# Patient Record
Sex: FEMALE | Race: WHITE | HISPANIC OR LATINO | Employment: OTHER | URBAN - METROPOLITAN AREA
[De-identification: names, ages, dates, MRNs, and addresses within clinical notes are randomized per-mention and may not be internally consistent; named-entity substitution may affect disease eponyms.]

---

## 2020-10-22 ENCOUNTER — APPOINTMENT (OUTPATIENT)
Dept: CT IMAGING | Facility: CLINIC | Age: 80
End: 2020-10-22
Attending: EMERGENCY MEDICINE
Payer: COMMERCIAL

## 2020-10-22 ENCOUNTER — HOSPITAL ENCOUNTER (EMERGENCY)
Facility: CLINIC | Age: 80
Discharge: OTHER INSTITUTION | End: 2020-10-22
Attending: EMERGENCY MEDICINE | Admitting: EMERGENCY MEDICINE
Payer: COMMERCIAL

## 2020-10-22 ENCOUNTER — HOSPITAL ENCOUNTER (OUTPATIENT)
Facility: CLINIC | Age: 80
Setting detail: OBSERVATION
Discharge: HOME | End: 2020-10-23
Attending: INTERNAL MEDICINE | Admitting: INTERNAL MEDICINE
Payer: COMMERCIAL

## 2020-10-22 VITALS
DIASTOLIC BLOOD PRESSURE: 84 MMHG | SYSTOLIC BLOOD PRESSURE: 163 MMHG | WEIGHT: 175 LBS | TEMPERATURE: 98.3 F | BODY MASS INDEX: 33.04 KG/M2 | OXYGEN SATURATION: 99 % | RESPIRATION RATE: 17 BRPM | HEART RATE: 53 BPM | HEIGHT: 61 IN

## 2020-10-22 DIAGNOSIS — R68.89 EPISODES OF DECREASED ATTENTIVENESS: ICD-10-CM

## 2020-10-22 DIAGNOSIS — G45.9 TIA (TRANSIENT ISCHEMIC ATTACK): Primary | ICD-10-CM

## 2020-10-22 DIAGNOSIS — R47.9 SPEECH DISTURBANCE, UNSPECIFIED TYPE: ICD-10-CM

## 2020-10-22 DIAGNOSIS — I95.9 TRANSIENT HYPOTENSION: ICD-10-CM

## 2020-10-22 PROBLEM — R47.81 SLURRED SPEECH: Status: ACTIVE | Noted: 2020-10-22

## 2020-10-22 LAB
ALBUMIN SERPL-MCNC: 3.1 G/DL (ref 3.4–5)
ALBUMIN UR-MCNC: NEGATIVE MG/DL
ALP SERPL-CCNC: 75 U/L (ref 40–150)
ALT SERPL W P-5'-P-CCNC: 16 U/L (ref 0–50)
ANION GAP SERPL CALCULATED.3IONS-SCNC: 4 MMOL/L (ref 3–14)
APPEARANCE UR: CLEAR
AST SERPL W P-5'-P-CCNC: 15 U/L (ref 0–45)
BASOPHILS # BLD AUTO: 0 10E9/L (ref 0–0.2)
BASOPHILS NFR BLD AUTO: 0.2 %
BILIRUB SERPL-MCNC: 0.5 MG/DL (ref 0.2–1.3)
BILIRUB UR QL STRIP: NEGATIVE
BUN SERPL-MCNC: 31 MG/DL (ref 7–30)
CALCIUM SERPL-MCNC: 7.9 MG/DL (ref 8.5–10.1)
CHLORIDE SERPL-SCNC: 106 MMOL/L (ref 94–109)
CHOLEST SERPL-MCNC: 186 MG/DL
CO2 SERPL-SCNC: 28 MMOL/L (ref 20–32)
COLOR UR AUTO: YELLOW
CREAT BLD-MCNC: 1.3 MG/DL (ref 0.52–1.04)
CREAT SERPL-MCNC: 1.21 MG/DL (ref 0.52–1.04)
D DIMER PPP FEU-MCNC: 0.8 UG/ML FEU (ref 0–0.5)
DIFFERENTIAL METHOD BLD: ABNORMAL
EOSINOPHIL # BLD AUTO: 0.1 10E9/L (ref 0–0.7)
EOSINOPHIL NFR BLD AUTO: 2.9 %
ERYTHROCYTE [DISTWIDTH] IN BLOOD BY AUTOMATED COUNT: 14.2 % (ref 10–15)
GFR SERPL CREATININE-BSD FRML MDRD: 39 ML/MIN/{1.73_M2}
GFR SERPL CREATININE-BSD FRML MDRD: 42 ML/MIN/{1.73_M2}
GLUCOSE SERPL-MCNC: 91 MG/DL (ref 70–99)
GLUCOSE UR STRIP-MCNC: NEGATIVE MG/DL
HBA1C MFR BLD: 5.6 % (ref 0–5.6)
HCT VFR BLD AUTO: 34.7 % (ref 35–47)
HDLC SERPL-MCNC: 65 MG/DL
HGB BLD-MCNC: 11.3 G/DL (ref 11.7–15.7)
HGB UR QL STRIP: NEGATIVE
IMM GRANULOCYTES # BLD: 0 10E9/L (ref 0–0.4)
IMM GRANULOCYTES NFR BLD: 0 %
INTERPRETATION ECG - MUSE: NORMAL
KETONES UR STRIP-MCNC: NEGATIVE MG/DL
LDLC SERPL CALC-MCNC: 100 MG/DL
LEUKOCYTE ESTERASE UR QL STRIP: NEGATIVE
LYMPHOCYTES # BLD AUTO: 1.6 10E9/L (ref 0.8–5.3)
LYMPHOCYTES NFR BLD AUTO: 32.7 %
MCH RBC QN AUTO: 30.5 PG (ref 26.5–33)
MCHC RBC AUTO-ENTMCNC: 32.6 G/DL (ref 31.5–36.5)
MCV RBC AUTO: 94 FL (ref 78–100)
MONOCYTES # BLD AUTO: 0.4 10E9/L (ref 0–1.3)
MONOCYTES NFR BLD AUTO: 7.9 %
MUCOUS THREADS #/AREA URNS LPF: PRESENT /LPF
NEUTROPHILS # BLD AUTO: 2.7 10E9/L (ref 1.6–8.3)
NEUTROPHILS NFR BLD AUTO: 56.3 %
NITRATE UR QL: NEGATIVE
NONHDLC SERPL-MCNC: 121 MG/DL
NRBC # BLD AUTO: 0 10*3/UL
NRBC BLD AUTO-RTO: 0 /100
PH UR STRIP: 6.5 PH (ref 5–7)
PLATELET # BLD AUTO: 184 10E9/L (ref 150–450)
POTASSIUM SERPL-SCNC: 3.8 MMOL/L (ref 3.4–5.3)
PROT SERPL-MCNC: 6.5 G/DL (ref 6.8–8.8)
RBC # BLD AUTO: 3.7 10E12/L (ref 3.8–5.2)
RBC #/AREA URNS AUTO: <1 /HPF (ref 0–2)
SODIUM SERPL-SCNC: 138 MMOL/L (ref 133–144)
SOURCE: ABNORMAL
SP GR UR STRIP: 1.01 (ref 1–1.03)
SQUAMOUS #/AREA URNS AUTO: <1 /HPF (ref 0–1)
TRIGL SERPL-MCNC: 107 MG/DL
TROPONIN I SERPL-MCNC: <0.015 UG/L (ref 0–0.04)
UROBILINOGEN UR STRIP-MCNC: NORMAL MG/DL (ref 0–2)
WBC # BLD AUTO: 4.8 10E9/L (ref 4–11)
WBC #/AREA URNS AUTO: 1 /HPF (ref 0–5)

## 2020-10-22 PROCEDURE — 82565 ASSAY OF CREATININE: CPT

## 2020-10-22 PROCEDURE — G0378 HOSPITAL OBSERVATION PER HR: HCPCS

## 2020-10-22 PROCEDURE — C9803 HOPD COVID-19 SPEC COLLECT: HCPCS

## 2020-10-22 PROCEDURE — 81001 URINALYSIS AUTO W/SCOPE: CPT | Performed by: EMERGENCY MEDICINE

## 2020-10-22 PROCEDURE — 93010 ELECTROCARDIOGRAM REPORT: CPT | Performed by: INTERNAL MEDICINE

## 2020-10-22 PROCEDURE — 96361 HYDRATE IV INFUSION ADD-ON: CPT

## 2020-10-22 PROCEDURE — 96360 HYDRATION IV INFUSION INIT: CPT

## 2020-10-22 PROCEDURE — U0003 INFECTIOUS AGENT DETECTION BY NUCLEIC ACID (DNA OR RNA); SEVERE ACUTE RESPIRATORY SYNDROME CORONAVIRUS 2 (SARS-COV-2) (CORONAVIRUS DISEASE [COVID-19]), AMPLIFIED PROBE TECHNIQUE, MAKING USE OF HIGH THROUGHPUT TECHNOLOGIES AS DESCRIBED BY CMS-2020-01-R: HCPCS | Performed by: EMERGENCY MEDICINE

## 2020-10-22 PROCEDURE — 99220 PR INITIAL OBSERVATION CARE,LEVEL III: CPT | Performed by: PHYSICIAN ASSISTANT

## 2020-10-22 PROCEDURE — 70496 CT ANGIOGRAPHY HEAD: CPT

## 2020-10-22 PROCEDURE — 250N000013 HC RX MED GY IP 250 OP 250 PS 637: Performed by: PHYSICIAN ASSISTANT

## 2020-10-22 PROCEDURE — 258N000003 HC RX IP 258 OP 636: Performed by: EMERGENCY MEDICINE

## 2020-10-22 PROCEDURE — 83036 HEMOGLOBIN GLYCOSYLATED A1C: CPT | Performed by: PHYSICIAN ASSISTANT

## 2020-10-22 PROCEDURE — 80061 LIPID PANEL: CPT | Performed by: PHYSICIAN ASSISTANT

## 2020-10-22 PROCEDURE — 85379 FIBRIN DEGRADATION QUANT: CPT | Performed by: EMERGENCY MEDICINE

## 2020-10-22 PROCEDURE — 250N000011 HC RX IP 250 OP 636: Performed by: EMERGENCY MEDICINE

## 2020-10-22 PROCEDURE — 99285 EMERGENCY DEPT VISIT HI MDM: CPT | Mod: 25

## 2020-10-22 PROCEDURE — 80053 COMPREHEN METABOLIC PANEL: CPT | Performed by: EMERGENCY MEDICINE

## 2020-10-22 PROCEDURE — 250N000009 HC RX 250: Performed by: EMERGENCY MEDICINE

## 2020-10-22 PROCEDURE — 84484 ASSAY OF TROPONIN QUANT: CPT | Performed by: EMERGENCY MEDICINE

## 2020-10-22 PROCEDURE — 36415 COLL VENOUS BLD VENIPUNCTURE: CPT | Performed by: PHYSICIAN ASSISTANT

## 2020-10-22 PROCEDURE — 85025 COMPLETE CBC W/AUTO DIFF WBC: CPT | Performed by: EMERGENCY MEDICINE

## 2020-10-22 PROCEDURE — 71260 CT THORAX DX C+: CPT

## 2020-10-22 PROCEDURE — 70450 CT HEAD/BRAIN W/O DYE: CPT

## 2020-10-22 RX ORDER — LEVOTHYROXINE SODIUM 75 UG/1
75 TABLET ORAL
COMMUNITY
Start: 2020-06-09

## 2020-10-22 RX ORDER — METOPROLOL SUCCINATE 25 MG/1
25 TABLET, EXTENDED RELEASE ORAL DAILY
COMMUNITY
Start: 2020-09-01

## 2020-10-22 RX ORDER — POLYETHYLENE GLYCOL 3350 17 G/17G
17 POWDER, FOR SOLUTION ORAL DAILY PRN
Status: DISCONTINUED | OUTPATIENT
Start: 2020-10-22 | End: 2020-10-23 | Stop reason: HOSPADM

## 2020-10-22 RX ORDER — TOLTERODINE TARTRATE 1 MG/1
1 TABLET, EXTENDED RELEASE ORAL
COMMUNITY

## 2020-10-22 RX ORDER — AMOXICILLIN 250 MG
2 CAPSULE ORAL 2 TIMES DAILY PRN
Status: DISCONTINUED | OUTPATIENT
Start: 2020-10-22 | End: 2020-10-23 | Stop reason: HOSPADM

## 2020-10-22 RX ORDER — LATANOPROST 50 UG/ML
1 SOLUTION/ DROPS OPHTHALMIC AT BEDTIME
COMMUNITY
Start: 2020-08-06

## 2020-10-22 RX ORDER — ONDANSETRON 2 MG/ML
4 INJECTION INTRAMUSCULAR; INTRAVENOUS EVERY 6 HOURS PRN
Status: DISCONTINUED | OUTPATIENT
Start: 2020-10-22 | End: 2020-10-23 | Stop reason: HOSPADM

## 2020-10-22 RX ORDER — CITALOPRAM HYDROBROMIDE 20 MG/1
20 TABLET ORAL EVERY MORNING
COMMUNITY
Start: 2020-08-25

## 2020-10-22 RX ORDER — PROCHLORPERAZINE 25 MG
12.5 SUPPOSITORY, RECTAL RECTAL EVERY 12 HOURS PRN
Status: DISCONTINUED | OUTPATIENT
Start: 2020-10-22 | End: 2020-10-23 | Stop reason: HOSPADM

## 2020-10-22 RX ORDER — HYDROCHLOROTHIAZIDE 25 MG/1
25 TABLET ORAL DAILY
COMMUNITY
Start: 2020-09-01

## 2020-10-22 RX ORDER — NALOXONE HYDROCHLORIDE 0.4 MG/ML
.1-.4 INJECTION, SOLUTION INTRAMUSCULAR; INTRAVENOUS; SUBCUTANEOUS
Status: DISCONTINUED | OUTPATIENT
Start: 2020-10-22 | End: 2020-10-23 | Stop reason: HOSPADM

## 2020-10-22 RX ORDER — ONDANSETRON 4 MG/1
4 TABLET, ORALLY DISINTEGRATING ORAL EVERY 6 HOURS PRN
Status: DISCONTINUED | OUTPATIENT
Start: 2020-10-22 | End: 2020-10-23 | Stop reason: HOSPADM

## 2020-10-22 RX ORDER — BUSPIRONE HYDROCHLORIDE 5 MG/1
5 TABLET ORAL 2 TIMES DAILY
COMMUNITY
Start: 2020-08-05

## 2020-10-22 RX ORDER — AMOXICILLIN 250 MG
1 CAPSULE ORAL 2 TIMES DAILY PRN
Status: DISCONTINUED | OUTPATIENT
Start: 2020-10-22 | End: 2020-10-23 | Stop reason: HOSPADM

## 2020-10-22 RX ORDER — MULTIVITAMIN WITH IRON
1 TABLET ORAL DAILY
COMMUNITY

## 2020-10-22 RX ORDER — AMLODIPINE BESYLATE 5 MG/1
5 TABLET ORAL DAILY
Status: ON HOLD | COMMUNITY
Start: 2020-10-19 | End: 2020-10-23

## 2020-10-22 RX ORDER — IOPAMIDOL 755 MG/ML
100 INJECTION, SOLUTION INTRAVASCULAR ONCE
Status: COMPLETED | OUTPATIENT
Start: 2020-10-22 | End: 2020-10-22

## 2020-10-22 RX ORDER — ASPIRIN 300 MG/1
300 SUPPOSITORY RECTAL DAILY
Status: DISCONTINUED | OUTPATIENT
Start: 2020-10-22 | End: 2020-10-23

## 2020-10-22 RX ORDER — SODIUM CHLORIDE 9 MG/ML
INJECTION, SOLUTION INTRAVENOUS CONTINUOUS
Status: ACTIVE | OUTPATIENT
Start: 2020-10-22 | End: 2020-10-23

## 2020-10-22 RX ORDER — SODIUM CHLORIDE 9 MG/ML
INJECTION, SOLUTION INTRAVENOUS CONTINUOUS
Status: DISCONTINUED | OUTPATIENT
Start: 2020-10-22 | End: 2020-10-22 | Stop reason: HOSPADM

## 2020-10-22 RX ORDER — BRIMONIDINE TARTRATE 2 MG/ML
1 SOLUTION/ DROPS OPHTHALMIC 2 TIMES DAILY
COMMUNITY
Start: 2020-08-13

## 2020-10-22 RX ORDER — PROCHLORPERAZINE MALEATE 5 MG
5 TABLET ORAL EVERY 6 HOURS PRN
Status: DISCONTINUED | OUTPATIENT
Start: 2020-10-22 | End: 2020-10-23 | Stop reason: HOSPADM

## 2020-10-22 RX ORDER — BETAMETHASONE DIPROPIONATE 0.05 %
1 OINTMENT (GRAM) TOPICAL 2 TIMES DAILY PRN
COMMUNITY
Start: 2019-12-12

## 2020-10-22 RX ORDER — ACETAMINOPHEN 650 MG/1
650 SUPPOSITORY RECTAL EVERY 4 HOURS PRN
Status: DISCONTINUED | OUTPATIENT
Start: 2020-10-22 | End: 2020-10-23 | Stop reason: HOSPADM

## 2020-10-22 RX ORDER — SODIUM PHOSPHATE,MONO-DIBASIC 19G-7G/118
1 ENEMA (ML) RECTAL DAILY
COMMUNITY

## 2020-10-22 RX ORDER — ROSUVASTATIN CALCIUM 5 MG/1
20 TABLET, COATED ORAL DAILY
Status: DISCONTINUED | OUTPATIENT
Start: 2020-10-23 | End: 2020-10-23

## 2020-10-22 RX ORDER — LOSARTAN POTASSIUM 25 MG/1
25 TABLET ORAL 2 TIMES DAILY
COMMUNITY
Start: 2019-12-08

## 2020-10-22 RX ORDER — SODIUM CHLORIDE 5 %
1 OINTMENT (GRAM) OPHTHALMIC (EYE)
COMMUNITY
Start: 2020-08-06

## 2020-10-22 RX ORDER — ACETAMINOPHEN 325 MG/1
650 TABLET ORAL EVERY 4 HOURS PRN
Status: DISCONTINUED | OUTPATIENT
Start: 2020-10-22 | End: 2020-10-23 | Stop reason: HOSPADM

## 2020-10-22 RX ADMIN — SODIUM CHLORIDE: 9 INJECTION, SOLUTION INTRAVENOUS at 16:05

## 2020-10-22 RX ADMIN — IOPAMIDOL 100 ML: 755 INJECTION, SOLUTION INTRAVENOUS at 14:00

## 2020-10-22 RX ADMIN — ASPIRIN 325 MG: 325 TABLET, COATED ORAL at 22:22

## 2020-10-22 RX ADMIN — SODIUM CHLORIDE 1000 ML: 9 INJECTION, SOLUTION INTRAVENOUS at 11:55

## 2020-10-22 RX ADMIN — SODIUM CHLORIDE 80 ML: 9 INJECTION, SOLUTION INTRAVENOUS at 14:00

## 2020-10-22 ASSESSMENT — ENCOUNTER SYMPTOMS
HEADACHES: 0
BLOOD IN STOOL: 0
COUGH: 0
WEAKNESS: 0
SHORTNESS OF BREATH: 0
FEVER: 0

## 2020-10-22 ASSESSMENT — MIFFLIN-ST. JEOR: SCORE: 1196.17

## 2020-10-22 NOTE — ED NOTES
Bed: ED13  Expected date:   Expected time:   Means of arrival:   Comments:  [facility] [place] 79 [place] hypotension eta 1100

## 2020-10-22 NOTE — PHARMACY-ADMISSION MEDICATION HISTORY
Pharmacy Medication History  Admission medication history interview status for the 10/22/2020  admission is complete. See EPIC admission navigator for prior to admission medications       Medication history sources: Patient with assistance of pt's daughter   Location of interview: Patient room  Medication history source reliability: Good  Adherence assessment: Good    Significant changes made to the medication list:  All meds added      Additional medication history information:    Amlodipine is NEW Rx.  Not yet started.  Tolterodine 1 mg BID is on HOLD    Medication reconciliation completed by provider prior to medication history? No    Time spent in this activity: 20 minutes       Prior to Admission medications    Medication Sig Last Dose Taking? Auth Provider   betamethasone dipropionate (DIPROSONE) 0.05 % external ointment Apply 1 Application topically 2 times daily as needed for rash  at PRN Yes Unknown, Entered By History   brimonidine (ALPHAGAN) 0.2 % ophthalmic solution Place 1 drop into both eyes 2 times daily 10/22/2020 at AM Yes Unknown, Entered By History   busPIRone (BUSPAR) 5 MG tablet Take 5 mg by mouth 2 times daily 10/22/2020 at AM Yes Unknown, Entered By History   citalopram (CELEXA) 20 MG tablet Take 20 mg by mouth every morning 10/22/2020 at AM Yes Unknown, Entered By History   glucosamine-chondroitin 500-400 MG CAPS per capsule Take 1 capsule by mouth daily 10/22/2020 at Unknown time Yes Unknown, Entered By History   hydrochlorothiazide (HYDRODIURIL) 25 MG tablet Take 25 mg by mouth daily 10/22/2020 at AM Yes Unknown, Entered By History   latanoprost (XALATAN) 0.005 % ophthalmic solution Place 1 drop into both eyes At Bedtime 10/21/2020 at HS Yes Unknown, Entered By History   levothyroxine (SYNTHROID/LEVOTHROID) 75 MCG tablet Take 75 mcg by mouth daily before breakfast 10/22/2020 at AM Yes Unknown, Entered By History   losartan (COZAAR) 25 MG tablet Take 25 mg by mouth 2 times daily 10/22/2020  at AM Yes Unknown, Entered By History   magnesium 250 MG tablet Take 1 tablet by mouth daily OTC supplement 10/22/2020 at Unknown time Yes Unknown, Entered By History   metoprolol succinate ER (TOPROL-XL) 25 MG 24 hr tablet Take 25 mg by mouth daily in the Afternoon 10/21/2020 at Afternoon  Yes Unknown, Entered By History   sodium chloride (MCKINLEY 128) 5 % ophthalmic ointment Place 1 Application into both eyes nightly as needed for dry eyes  at PRN Yes Unknown, Entered By History   amLODIPine (NORVASC) 5 MG tablet Take 5 mg by mouth daily   Unknown, Entered By History   polyethylene glycol-propylene glycol (SYSTANE ULTRA) 0.4-0.3 % SOLN ophthalmic solution Apply 1-2 drops to eye 4 times daily as needed for dry eyes  at PRN  Unknown, Entered By History   tolterodine (DETROL) 1 MG tablet Take 1 mg by mouth ON HOLD  at HOLD  Unknown, Entered By History

## 2020-10-22 NOTE — ED TRIAGE NOTES
Pt was napping at work, staff woke her up and was confused, pale and cyanotic. Medics arrived and pt was more alert, BP was 70/40. Pt takes medication for hypertension.

## 2020-10-22 NOTE — LETTER
[facility] OBSERVATION DEPT  [address]  [place] [address]  [phone] October 23, 2020    RE:  Melissa Quinonez                                                                                                                                                       [address] [place] [address]            To whom it may concern:    Melissa Quinonez is under my professional care for medical reasons from 10/22/2020 to 10/23/2020. She may return to work as scheduled on 10/26/2020 without restrictions.    Sincerely,  Dary Cast PA-C

## 2020-10-22 NOTE — ED PROVIDER NOTES
History     Chief Complaint:  Hypotension    The history is provided by a relative. The history is limited by a language barrier. A  was used (Daughter interpreted).      Melissa Quinonez is a 79 year old female who presents for evaluation of hypotension. The patient reports today while her class was on a break at school she fell asleep and when someone tried to wake her up she was unable to open her eyes. She states she remembers being on a break and feeling sleepy but she does not remember falling asleep and the next thing she remembers is her teacher trying to wake her up. She denies any palpitations or CP or SOB prior to the episode. Her daughter notes when she was driving her to school today she was quiet and fell asleep, which is not normal for her. She denies visual disturbance, headache, extremity weakness, chest pain, shortness of breath, blood in stool, fever, and cough. She denies any recent medication changes.     Allergies:  Enalapril      Medications:    Detrol  Celexa  Cozaar  Buspar  Levothyroxine    Metoprolol succinate   Hydrochlorothiazide   Amlodipine     Past Medical History:    Hypertension   Arthritis     Past Surgical History:    Right knee replacement   Bunionectomy   Breast cyst excision    Family History:    Heart disease   Cancer  Diverticulitis     Social History:  Smoking status- never smoker  Alcohol use- not currently    Marital Status:       Review of Systems   Constitutional: Negative for fever.   Eyes: Negative for visual disturbance.   Respiratory: Negative for cough and shortness of breath.    Cardiovascular: Negative for chest pain.        Positive for hypotension    Gastrointestinal: Negative for blood in stool.   Neurological: Negative for weakness and headaches.   All other systems reviewed and are negative.    Physical Exam     Patient Vitals for the past 24 hrs:   BP Temp Temp src Pulse Resp SpO2   10/22/20 1230 135/75 -- -- 50 10 99 %    10/22/20 1215 119/71 -- -- (!) 49 17 100 %   10/22/20 1200 103/56 -- -- (!) 49 13 98 %   10/22/20 1145 96/57 -- -- (!) 49 16 98 %   10/22/20 1130 103/52 -- -- (!) 48 -- 99 %   10/22/20 1119 100/60 98.3  F (36.8  C) Oral 51 -- 99 %     Physical Exam  VS: Reviewed per above  HENT: Mucous membranes moist, no nuchal rigidity  EYES: sclera anicteric  CV: Rate as noted, regular rhythm.   RESP: Effort normal. Breath sounds are normal bilaterally.  GI: no tenderness/rebound/guarding, not distended.  NEURO: GCS 15, cranial nerves II through XII are intact, 5 out of 5 strength in all 4 extremities, sensation is intact light touch in all 4 extremities  MSK: No deformity of the extremities  SKIN: Warm and dry    Emergency Department Course     ECG (11:27:30):  Rate 51 bpm. WV interval 224. QRS duration 94. QT/QTc 504/464. P-R-T axes 37 20 26. Sinus jossue cardia with 1st degree AV block. Nonspecific T wave abnormality. Abnormal ECG. Interpreted at 1134 by Abel Karimi MD.     Imaging:  Radiology findings were communicated with the patient who voiced understanding of the findings.    CTA Head Neck w contrast   Patent arteries in the head and neck without vascular  cutoff. No evidence of dissection. No aneurysm identified. No  significant stenosis.  Reading per radiology     CT Aortic Survey w contrast   1.  No aortic aneurysm or dissection. No acute findings in the chest,  abdomen, or pelvis.  2.  Incidental left inguinal hernia containing nonobstructed bowel.  Reading per radiology     CT Head wo contrast   Diffuse cerebral volume loss and cerebral white matter  changes consistent with chronic small vessel ischemic disease. No  evidence for acute intracranial pathology.  Reading per radiology    Laboratory:  Laboratory findings were communicated with the patient who voiced understanding of the findings.    CBC: RBC Count 3.70 (L), HGB 11.3 (L), Hematocrit 34.7 (L) o/w WNL (WBC 4.8, )  CMP: Urea Nitrogen: 31 (H),  GFR: 42 (L), Calcium: 7.9 (L), Albumin: 3.1 (L), Protein Total: 6.5 (L), o/w WNL (Creatinine: 1.21 (H))    Troponin (Collected 1248): <0.015    D-dimer: 0.8 (H)   Creatinine POCT: Creatinine 1.3 (H), GFR Estimate 39 (L)     UA with Microscopic: mucous - present o/w negative.      Interventions:  1155 NS 1L IV Bolus     Emergency Department Course:  Past medical records, nursing notes, and vitals reviewed.    1127 EKG obtained in the ED, see results above.     1134 I performed an exam of the patient as documented above.     1147 IV was inserted and blood was drawn for laboratory testing, results above.    1200 The patient was sent for a CTA head neck, CT head, CT aortic survey while in the emergency department, results above.     1301 The patient provided a urine sample here in the emergency department. This was sent for laboratory testing, findings above.    I rechecked the patient and discussed the results of the ED workup thus far.     Patient was signed out to Dr. Sheriff, pending accepting facility.    Impression & Plan     Medical Decision Making:  Patient presents to the ER with daughter for evaluation of episode of decreased responsiveness, low blood pressure, possible cyanosis.  On arrival vital signs are reassuring.  She has no focal neurological deficits, she is awake and alert.    Per paramedic report, patient had episode at school of cyanosis, decreased responsiveness and a blood pressure in the 70s systolic.  Patient does not recall any symptoms surrounding this event.    EKG here is sinus rhythm with rates in the high 40s and low 50s.  She does have a first-degree AV block.  She does take multiple antihypertensives including losartan, metoprolol, hydrochlorothiazide.  She is supposed to start amlodipine, but has not started this yet.    Initial labs are reassuring.  No evidence of ACS or significant NILAY UTI.  She was given IV fluids.  Looking for causes of low blood pressure and cyanosis, a D-dimer was  sent for look for occult PE.  Unfortunately this was slightly elevated.  Around this time, I was notified that patient was having speech disturbance with possible slurred speech.  On my assessment, there were no other focal deficits appreciated and symptoms were actually resolving when I went in the room.  It is possible this represented a TIA like event.    Plan for CT imaging of the head as well as CTA of the head and neck. additionally with episode of hypotension and neuro deficits, I considered occult aortic dissection and aortic survey was obtained.  Fortunately all 3 of the studies were negative.  I have low suspicion for hemodynamically significant PE given reassuring aortic survey and normotension here.  Occult small PE is certainly hard to rule out however.    At this juncture, exact etiology of her episode of decreased responsiveness this morning is unclear.  Likewise she had an unspecified speech disturbance in the ER that could represent TIA although fortunately is currently neuro intact.  I feel that she requires further admission for cardiac evaluation and possible neurology evaluation.  Due to lack of bed availability here, plan for transfer at signout to my colleague Dr. Raygoza.    Diagnosis:    ICD-10-CM    1. Transient hypotension  I95.9    2. Episodes of decreased attentiveness  R68.89    3. Speech disturbance, unspecified type  R47.9      Disposition:  Signout to Dr. Raygoza, pending transfer to facility with inpatient beds.    Discharge Medications:  New Prescriptions    No medications on file     Scribe Disclosure:  I, Maria Esther Coleman, am serving as a scribe at 11:27 AM on 10/22/2020 to document services personally performed by Abel Karimi MD based on my observations and the provider's statements to me.        Abel Karimi MD  10/22/20 [id number]

## 2020-10-23 ENCOUNTER — APPOINTMENT (OUTPATIENT)
Dept: CARDIOLOGY | Facility: CLINIC | Age: 80
End: 2020-10-23
Attending: PHYSICIAN ASSISTANT
Payer: COMMERCIAL

## 2020-10-23 ENCOUNTER — APPOINTMENT (OUTPATIENT)
Dept: MRI IMAGING | Facility: CLINIC | Age: 80
End: 2020-10-23
Attending: PHYSICIAN ASSISTANT
Payer: COMMERCIAL

## 2020-10-23 VITALS
OXYGEN SATURATION: 97 % | RESPIRATION RATE: 18 BRPM | DIASTOLIC BLOOD PRESSURE: 79 MMHG | TEMPERATURE: 96.5 F | HEART RATE: 59 BPM | SYSTOLIC BLOOD PRESSURE: 156 MMHG

## 2020-10-23 LAB
ANION GAP SERPL CALCULATED.3IONS-SCNC: 6 MMOL/L (ref 3–14)
BASOPHILS # BLD AUTO: 0 10E9/L (ref 0–0.2)
BASOPHILS NFR BLD AUTO: 0.9 %
BUN SERPL-MCNC: 19 MG/DL (ref 7–30)
CALCIUM SERPL-MCNC: 7.9 MG/DL (ref 8.5–10.1)
CHLORIDE SERPL-SCNC: 110 MMOL/L (ref 94–109)
CO2 SERPL-SCNC: 26 MMOL/L (ref 20–32)
CREAT SERPL-MCNC: 0.81 MG/DL (ref 0.52–1.04)
DIFFERENTIAL METHOD BLD: ABNORMAL
EOSINOPHIL # BLD AUTO: 0.2 10E9/L (ref 0–0.7)
EOSINOPHIL NFR BLD AUTO: 6.2 %
ERYTHROCYTE [DISTWIDTH] IN BLOOD BY AUTOMATED COUNT: 13.9 % (ref 10–15)
GFR SERPL CREATININE-BSD FRML MDRD: 67 ML/MIN/{1.73_M2}
GLUCOSE SERPL-MCNC: 89 MG/DL (ref 70–99)
HCT VFR BLD AUTO: 32.5 % (ref 35–47)
HGB BLD-MCNC: 10.6 G/DL (ref 11.7–15.7)
IMM GRANULOCYTES # BLD: 0 10E9/L (ref 0–0.4)
IMM GRANULOCYTES NFR BLD: 0.3 %
LABORATORY COMMENT REPORT: NORMAL
LYMPHOCYTES # BLD AUTO: 1.5 10E9/L (ref 0.8–5.3)
LYMPHOCYTES NFR BLD AUTO: 45.4 %
MCH RBC QN AUTO: 30.9 PG (ref 26.5–33)
MCHC RBC AUTO-ENTMCNC: 32.6 G/DL (ref 31.5–36.5)
MCV RBC AUTO: 95 FL (ref 78–100)
MONOCYTES # BLD AUTO: 0.4 10E9/L (ref 0–1.3)
MONOCYTES NFR BLD AUTO: 11.6 %
NEUTROPHILS # BLD AUTO: 1.2 10E9/L (ref 1.6–8.3)
NEUTROPHILS NFR BLD AUTO: 35.6 %
NRBC # BLD AUTO: 0 10*3/UL
NRBC BLD AUTO-RTO: 0 /100
PLATELET # BLD AUTO: 161 10E9/L (ref 150–450)
POTASSIUM SERPL-SCNC: 3.5 MMOL/L (ref 3.4–5.3)
RBC # BLD AUTO: 3.43 10E12/L (ref 3.8–5.2)
SARS-COV-2 RNA SPEC QL NAA+PROBE: NEGATIVE
SARS-COV-2 RNA SPEC QL NAA+PROBE: NORMAL
SODIUM SERPL-SCNC: 142 MMOL/L (ref 133–144)
SPECIMEN SOURCE: NORMAL
SPECIMEN SOURCE: NORMAL
WBC # BLD AUTO: 3.4 10E9/L (ref 4–11)

## 2020-10-23 PROCEDURE — 999N000105 HC STATISTIC NO DOCUMENTATION TO SUPPORT CHARGE

## 2020-10-23 PROCEDURE — 36415 COLL VENOUS BLD VENIPUNCTURE: CPT | Performed by: PHYSICIAN ASSISTANT

## 2020-10-23 PROCEDURE — 93272 ECG/REVIEW INTERPRET ONLY: CPT | Performed by: INTERNAL MEDICINE

## 2020-10-23 PROCEDURE — 96360 HYDRATION IV INFUSION INIT: CPT | Mod: 59

## 2020-10-23 PROCEDURE — 93306 TTE W/DOPPLER COMPLETE: CPT | Mod: 26 | Performed by: INTERNAL MEDICINE

## 2020-10-23 PROCEDURE — 96361 HYDRATE IV INFUSION ADD-ON: CPT

## 2020-10-23 PROCEDURE — 999N000157 HC STATISTIC RCP TIME EA 10 MIN

## 2020-10-23 PROCEDURE — 93005 ELECTROCARDIOGRAM TRACING: CPT | Mod: 59

## 2020-10-23 PROCEDURE — G0378 HOSPITAL OBSERVATION PER HR: HCPCS

## 2020-10-23 PROCEDURE — 258N000003 HC RX IP 258 OP 636: Performed by: PHYSICIAN ASSISTANT

## 2020-10-23 PROCEDURE — 70553 MRI BRAIN STEM W/O & W/DYE: CPT

## 2020-10-23 PROCEDURE — 80048 BASIC METABOLIC PNL TOTAL CA: CPT | Performed by: PHYSICIAN ASSISTANT

## 2020-10-23 PROCEDURE — 85025 COMPLETE CBC W/AUTO DIFF WBC: CPT | Performed by: PHYSICIAN ASSISTANT

## 2020-10-23 PROCEDURE — 999N000208 ECHOCARDIOGRAM COMPLETE

## 2020-10-23 PROCEDURE — G0426 INPT/ED TELECONSULT50: HCPCS | Mod: G0 | Performed by: PHYSICIAN ASSISTANT

## 2020-10-23 PROCEDURE — 250N000013 HC RX MED GY IP 250 OP 250 PS 637: Performed by: PHYSICIAN ASSISTANT

## 2020-10-23 PROCEDURE — 99217 PR OBSERVATION CARE DISCHARGE: CPT | Performed by: PHYSICIAN ASSISTANT

## 2020-10-23 PROCEDURE — 258N000001 HC RX 258: Performed by: INTERNAL MEDICINE

## 2020-10-23 PROCEDURE — A9585 GADOBUTROL INJECTION: HCPCS | Performed by: INTERNAL MEDICINE

## 2020-10-23 PROCEDURE — 93270 REMOTE 30 DAY ECG REV/REPORT: CPT

## 2020-10-23 PROCEDURE — 255N000002 HC RX 255 OP 636: Performed by: INTERNAL MEDICINE

## 2020-10-23 RX ORDER — ASPIRIN 325 MG
325 TABLET, DELAYED RELEASE (ENTERIC COATED) ORAL DAILY
Qty: 30 TABLET | Refills: 0 | Status: SHIPPED | OUTPATIENT
Start: 2020-11-14

## 2020-10-23 RX ORDER — ASPIRIN 81 MG/1
81 TABLET, CHEWABLE ORAL DAILY
Status: DISCONTINUED | OUTPATIENT
Start: 2020-10-24 | End: 2020-10-23 | Stop reason: HOSPADM

## 2020-10-23 RX ORDER — GADOBUTROL 604.72 MG/ML
7.5 INJECTION INTRAVENOUS ONCE
Status: COMPLETED | OUTPATIENT
Start: 2020-10-23 | End: 2020-10-23

## 2020-10-23 RX ORDER — ASPIRIN 81 MG/1
81 TABLET, CHEWABLE ORAL DAILY
Qty: 30 TABLET | Refills: 0 | Status: SHIPPED | OUTPATIENT
Start: 2020-10-24 | End: 2020-10-23

## 2020-10-23 RX ORDER — CLOPIDOGREL BISULFATE 75 MG/1
75 TABLET ORAL DAILY
Status: DISCONTINUED | OUTPATIENT
Start: 2020-10-24 | End: 2020-10-23 | Stop reason: HOSPADM

## 2020-10-23 RX ORDER — AMLODIPINE BESYLATE 5 MG/1
5 TABLET ORAL DAILY
COMMUNITY
Start: 2020-10-23

## 2020-10-23 RX ORDER — ASPIRIN 81 MG/1
81 TABLET, CHEWABLE ORAL DAILY
Qty: 21 TABLET | Refills: 0 | Status: SHIPPED | OUTPATIENT
Start: 2020-10-24 | End: 2020-12-02

## 2020-10-23 RX ORDER — ACYCLOVIR 200 MG/1
30 CAPSULE ORAL ONCE
Status: COMPLETED | OUTPATIENT
Start: 2020-10-23 | End: 2020-10-23

## 2020-10-23 RX ORDER — ATORVASTATIN CALCIUM 10 MG/1
10 TABLET, FILM COATED ORAL EVERY EVENING
Status: DISCONTINUED | OUTPATIENT
Start: 2020-10-23 | End: 2020-10-23 | Stop reason: HOSPADM

## 2020-10-23 RX ORDER — ATORVASTATIN CALCIUM 10 MG/1
10 TABLET, FILM COATED ORAL EVERY EVENING
Qty: 30 TABLET | Refills: 0 | Status: SHIPPED | OUTPATIENT
Start: 2020-10-23

## 2020-10-23 RX ORDER — CLOPIDOGREL BISULFATE 75 MG/1
75 TABLET ORAL DAILY
Qty: 21 TABLET | Refills: 0 | Status: SHIPPED | OUTPATIENT
Start: 2020-10-24 | End: 2020-12-02

## 2020-10-23 RX ORDER — CLOPIDOGREL BISULFATE 75 MG/1
300 TABLET ORAL ONCE
Status: COMPLETED | OUTPATIENT
Start: 2020-10-23 | End: 2020-10-23

## 2020-10-23 RX ADMIN — SODIUM CHLORIDE 30 ML: 9 INJECTION, SOLUTION INTRAMUSCULAR; INTRAVENOUS; SUBCUTANEOUS at 09:53

## 2020-10-23 RX ADMIN — SODIUM CHLORIDE: 9 INJECTION, SOLUTION INTRAVENOUS at 00:17

## 2020-10-23 RX ADMIN — ASPIRIN 325 MG: 325 TABLET, COATED ORAL at 09:19

## 2020-10-23 RX ADMIN — GADOBUTROL 7.5 ML: 604.72 INJECTION INTRAVENOUS at 12:23

## 2020-10-23 RX ADMIN — ROSUVASTATIN CALCIUM 20 MG: 5 TABLET, FILM COATED ORAL at 09:19

## 2020-10-23 RX ADMIN — ACETAMINOPHEN 650 MG: 325 TABLET, FILM COATED ORAL at 09:19

## 2020-10-23 RX ADMIN — CLOPIDOGREL BISULFATE 300 MG: 75 TABLET ORAL at 14:35

## 2020-10-23 NOTE — PHARMACY-ADMISSION MEDICATION HISTORY
Admission medication history interview status for this patient is complete. See [facility] admission navigator for allergy information, prior to admission medications and immunization status.     Medication history interview done via telephone during Covid-19 pandemic, indicate source(s): Patient and Daughter   Medication history resources (including written lists, pill bottles, clinic record):patient own written diary list      Changes made to PTA medication list:  Added: none  Deleted: none  Changed: none    Actions taken by pharmacist (provider contacted, etc):None     Additional medication history information:called to verify last doses since MEdRec was performed early today at other [facility]    Medication reconciliation/reorder completed by provider prior to medication history?  n   (Y/N)           Prior to Admission medications    Medication Sig Last Dose Taking? Auth Provider   amLODIPine (NORVASC) 5 MG tablet Take 5 [facility], [facility] By History   betamethasone dipropionate (DIPROSONE) 0.05 % external ointment Apply 1 Application topically 2 times daily as needed for rash prn Yes Unknown, Entered By History   brimonidine (ALPHAGAN) 0.2 % ophthalmic solution Place 1 drop into both eyes 2 times daily 10/22/2020 at am Yes Unknown, Entered By History   busPIRone (BUSPAR) 5 MG tablet Take 5 mg by mouth 2 times daily 10/22/2020 at am Yes Unknown, Entered By History   citalopram (CELEXA) 20 MG tablet Take 20 mg by mouth every morning 10/22/2020 at Unknown time Yes Unknown, Entered By History   glucosamine-chondroitin 500-400 MG CAPS per capsule Take 1 capsule by mouth daily 10/21/2020 at Unknown time Yes Unknown, Entered By History   hydrochlorothiazide (HYDRODIURIL) 25 MG tablet Take 25 mg by mouth daily 10/22/2020 at am Yes Unknown, Entered By History   latanoprost (XALATAN) 0.005 % ophthalmic solution Place 1 drop into both eyes At Bedtime 10/21/2020 at Unknown time Yes  Unknown, Entered By History   levothyroxine (SYNTHROID/LEVOTHROID) 75 MCG tablet Take 75 mcg by mouth daily before breakfast 10/22/2020 at am Yes Unknown, Entered By History   losartan (COZAAR) 25 MG tablet Take 25 mg by mouth 2 times daily 10/22/2020 at am Yes Unknown, Entered By History   magnesium 250 MG tablet Take 1 tablet by mouth daily OTC supplement 10/21/2020 at Unknown time Yes Unknown, Entered By History   metoprolol succinate ER (TOPROL-XL) 25 MG 24 hr tablet Take 25 mg by mouth daily in the Afternoon 10/21/2020 at Unknown time Yes Unknown, Entered By History   polyethylene glycol-propylene glycol (SYSTANE ULTRA) 0.4-0.3 % SOLN ophthalmic solution Apply 1-2 drops to eye 4 times daily as needed for dry eyes prn Yes Unknown, Entered By History   sodium chloride (MCKINLEY 128) 5 % ophthalmic ointment Place 1 Application into both eyes nightly as needed for dry eyes prn Yes Unknown, Entered By History   tolterodine (DETROL) 1 MG tablet Take 1 mg by mouth ON HOLD on hold  Unknown, Entered By History

## 2020-10-23 NOTE — PLAN OF CARE
Patient's After Visit Summary was reviewed with patient  And daughter who is acting as interpretor  Patient verbalized understanding of After Visit Summary, recommended follow up and was given an opportunity to ask questions.   Discharge medications sent home with patient/family:  No.  Instructed to  prescriptions at [facility]  Discharged with daughter, via wheelchair to main entrance of hospital with assist of NST    OBSERVATION patient END time: 18:53

## 2020-10-23 NOTE — PLAN OF CARE
PRIMARY DIAGNOSIS: TIA/slurred speech  OUTPATIENT/OBSERVATION GOALS TO BE MET BEFORE DISCHARGE:  1. Orthostatic performed: N/A     2. Diagnostic testing complete & at baseline neurologic testing: Yes- Still needs MRI, back to baseline neurologic status     3. Cleared by consultants (if involved): N/A     4. Interpretation of cardiac rhythm per telemetry tech: SR     5. Tolerating adequate PO diet and medications: Yes     6. Return to near baseline physical activity or neurologic status: Yes     Discharge Planner Nurse   Safe discharge environment identified: Yes  Barriers to discharge: Yes- still needs MRI done  and bubble echo done.       Entered by: Andreia Adams RN on 10/23/2020 at 6:45 AM    Please review provider order for any additional goals.   Nurse to notify provider when observation goals have been met and patient is ready for discharge.

## 2020-10-23 NOTE — DISCHARGE SUMMARY
[facility] / Observation Unit  Discharge Summary        Melissa Quinonez MRN# [id number]   YOB: 1940 Age: 79 year old     Date of Admission:  10/22/2020  Date of Discharge:  10/23/2020  Admitting Physician:  Mookie Pearce MD  Discharge Physician: Flory Henao PA-C  Discharging Service: Hospitalist      Primary Provider: Saige Bautista  Primary Care Physician Phone Number: [phone]         Primary Discharge Diagnoses:    Melissa Quinonez was admitted on 10/22/2020 with complaints of several episodes of unresponsiveness and an episode of speech difficulties witnessed in the ED concerning for TIA.     1. Possible TIA - episode of speech difficulties in ED, work up is negative acute stroke. Echocardiogram showed biatrial enlargement without significant valvular disease raising concern for poss PAF. 30 day cardiac event monitor placed on discharge. Stroke neurology consulted, recommend baby ASA and Plavix for 21 days then full dose ASA alone. Also recommend starting low dose statin. Follow up with PCP. Unresponsive episodes unlikely due to TIA  2. Unresponsive episodes - one episode was associated with hypotension. EKG shows bradycardia with 1st degree AV block. No significant arrhythmia noted here but given echo results, higher suspicion for intermittent arrhythmia. Will discharge on 30 day cardiac event monitor. Also recommend holding off on new medication, amlodipine, until follow up with PCP next week. If BP remains stable, consider starting amlodipine then.   3. NILAY - resolved with IVFs. Encouraged oral intake        Secondary Discharge Diagnoses:   HTN  HLP           Code Status:      DNR        Brief Hospital Summary:        Reason for your hospital stay      Several unresponsive episodes, one witnessed by EMS with hypotension and   one in [facility] ED with associated slurred speech. No arrhythmia   identified initially. Initial work up in ED was fairly unremarkable. CT    head and CTA of the head and neck were unremarkable. Renal function was   mildly elevated. MRI of the brain was obtained and negative for acute   process. Echocardiogram was done and showed biatrial enlargement without   significant valvular disease raising concern for possible arrhythmia,   although none identified here. Stroke Neurology was consulted. Discharge   home with 30 day event monitor.             Please refer to initial admission history and physical for further details.   Briefly, Melissa Quinonez was admitted on 10/22/2020 for complaints of unresponsive episodes and dysarthric speech concerning for TIA.     Initial work up in the ED revealed a negative CT scan of the head for any acute process. CTA was unremarkable.   EKG did not reveal evidence of significant cardiac arrhythmias or ischemia.  Pt was registered to the Observation Unit for further evaluation.     Pt was placed on telemetry and underwent frequent neuro checks.   Pt was anticoagulated with Aspirin and Plavix.  Laboratory results were reviewed and significant findings addressed.   ECHO with bubble was performed as well as MRI of the brain (with results listed below). No evidence of acute CVA was found. Neurology consult was obtained.  On the day of discharge, patient had resolution of the symptoms, telemetry did not reveal any significant arrhythmias, vitals remained stable and pt was deemed safe for discharge. Medications were reviewed and adjustments made as necessary. Pt is instructed to follow up as below.           Significant Lab During Hospitalization:      Recent Labs   Lab 10/23/20  0616 10/22/20  1248   WBC 3.4* 4.8   HGB 10.6* 11.3*   HCT 32.5* 34.7*   MCV 95 94    184     Recent Labs   Lab 10/23/20  0616 10/22/20  1256 10/22/20  1248     --  138   POTASSIUM 3.5  --  3.8   CHLORIDE 110*  --  106   CO2 26  --  28   ANIONGAP 6  --  4   GLC 89  --  91   BUN 19  --  31*   CR 0.81  --  1.21*   GFRESTIMATED 67 39*  42*   GFRESTBLACK 78 48* 48*   BILL 7.9*  --  7.9*   PROTTOTAL  --   --  6.5*   ALBUMIN  --   --  3.1*   BILITOTAL  --   --  0.5   ALKPHOS  --   --  75   AST  --   --  15   ALT  --   --  16     Recent Labs   Lab 10/22/20  1248   DD 0.8*     Recent Labs   Lab 10/22/20  1248   TROPI <0.015                Significant Imaging During Hospitalization:      Recent Results (from the past 48 hour(s))   Head CT w/o contrast    Narrative    CT OF THE HEAD WITHOUT CONTRAST 10/22/2020 1:54 PM     COMPARISON: Head CT 10/22/2020    HISTORY: Episodic slurred speech.    TECHNIQUE: 5 mm thick axial CT images of the head were acquired  without IV contrast material.    FINDINGS: There is mild diffuse cerebral volume loss. There are subtle  patchy areas of decreased density in the cerebral white matter  bilaterally that are consistent with sequela of chronic small vessel  ischemic disease.    The ventricles and basal cisterns are within normal limits in  configuration given the degree of cerebral volume loss. There is no  midline shift. There are no extra-axial fluid collections.    No intracranial hemorrhage, mass or recent infarct.    The visualized paranasal sinuses are well-aerated. There is no  mastoiditis. There are no fractures of the visualized bones.      Impression    IMPRESSION: Diffuse cerebral volume loss and cerebral white matter  changes consistent with chronic small vessel ischemic disease. No  evidence for acute intracranial pathology.        Radiation dose for this scan was reduced using automated exposure  control, adjustment of the mA and/or kV according to patient size, or  iterative reconstruction technique.    JORDAN CAMARILLO MD   CTA Head Neck with Contrast    Narrative    CT ANGIOGRAM OF THE HEAD AND NECK WITH CONTRAST  10/22/2020 2:01 PM     HISTORY: Episode slurred speech.     TECHNIQUE:  CT angiography with an injection of 100mL Isovue-370 IV  with scans through the head and neck. Images were transferred to  a  separate 3-D workstation where multiplanar reformations and 3-D images  were created. Estimates of carotid stenoses are made relative to the  distal internal carotid artery diameters except as noted. Radiation  dose for this scan was reduced using automated exposure control,  adjustment of the mA and/or kV according to patient size, or iterative  reconstruction technique.     COMPARISON: CT head of the same date    CT HEAD FINDINGS: Mild presumed chronic small-vessel ischemic changes  in the cerebral white matter. Please see separate report from head CT  of same date for additional details regarding structural brain  findings. No contrast enhancing lesions. Cerebral blood flow is  grossly normal.     CT ANGIOGRAM HEAD FINDINGS:  The major intracranial arteries including  the proximal branches of the anterior cerebral, middle cerebral, and  posterior cerebral arteries appear patent without vascular cutoff.  Hypoplastic A1 segment of the right anterior cerebral artery. Fetal  origin of the right posterior cerebral artery. No aneurysm identified.  No significant stenosis. Venous circulation is unremarkable.     CT ANGIOGRAM NECK FINDINGS:   Common origin of the right brachiocephalic and left common carotid  arteries from the aortic arch, a normal variant.     Right carotid artery: The right common and internal carotid arteries  are patent. No significant stenosis or atherosclerotic disease in the  carotid artery.     Left carotid artery: The left common and internal carotid arteries are  patent. No significant stenosis or atherosclerotic disease in the  carotid artery.     Vertebral arteries: Vertebral arteries are patent without evidence of  dissection. No significant stenosis.     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 Bilateral lens replacements. Small retention cyst in the right  maxillary sinus.  Subcentimeter thyroid nodules without aggressive  features, not necessitating follow-up by imaging criteria. Multilevel  degenerative disc disease, with no significant disc space narrowing at  C6-C7. Posterior endplate osteophytic ridging and uncovertebral  arthropathy and facet arthropathy seen at multiple levels of the  cervical spine.      Impression    IMPRESSION: Patent arteries in the head and neck without vascular  cutoff. No evidence of dissection. No aneurysm identified. No  significant stenosis.    BALBIR CAMARA MD   CT Aortic Survey w Contrast    Narrative    CT AORTIC SURVEY W CONTRAST 10/22/2020 2:01 PM    CLINICAL HISTORY: episode hypotension/cyanosis, transient speech  disturbance  TECHNIQUE: CT angiogram chest, abdomen, and pelvis during arterial  phase injection IV contrast. 2D and 3D MIP reconstructions were  performed by the CT technologist. Dose reduction techniques were used.      CONTRAST: 100mL Isovue-370    COMPARISON: None.    FINDINGS:  ANGIOGRAM: No aortic aneurysm or dissection. Great vessels in the  mediastinum are widely patent. Mesenteric, renal, and iliac arteries  are all widely patent as well. Mild atherosclerosis.    LUNGS AND PLEURA: Minimal dependent atelectasis. Lungs otherwise  clear. No pleural effusion or pneumothorax.    MEDIASTINUM/AXILLAE: Moderate sized hiatal hernia.    ABDOMEN AND PELVIS: Normal liver, gallbladder, pancreas, spleen,  adrenal glands, and kidneys. No lymphadenopathy. Bowel is  unremarkable. Left inguinal hernia contains nonobstructed bowel.    MUSCULOSKELETAL: Degenerative changes throughout the visualized spine.  Ankylosis of much of the thoracic spine.      Impression    IMPRESSION:  1.  No aortic aneurysm or dissection. No acute findings in the chest,  abdomen, or pelvis.  2.  Incidental left inguinal hernia containing nonobstructed bowel.    SUDEEP CARVALHO MD   Echocardiogram Complete - Bubble study    Narrative     [id number]  Formerly [facility]  [id number]  [id number]^NITHIN^HANNAH^JAVIER           [facility]  Echocardiography Laboratory  [address] [place] [address]        Name: FATMATA SWANN  MRN: [id number]  : 1940  Study Date: 10/23/2020 09:17 AM  Age: 79 yrs  Gender: Female  Patient Location: [facility]  Reason For Study: TIA  Ordering Physician: HANNAH WELLER  Referring Physician: BILLY FLETCHER  Performed By: Yu Galarza     BSA: 1.7 m2  Height: 66 in  Weight: 140 lb  BP: 169/74 mmHg  _____________________________________________________________________________  __        Procedure  Complete Portable Bubble Echo Adult.  _____________________________________________________________________________  __        Interpretation Summary     The visual ejection fraction is estimated at 60-65%.  The left atrium is severely dilated.  The right atrium is moderately dilated.  There is mild (1+) tricuspid regurgitation.  There is no comparison study available.  _____________________________________________________________________________  __        Left Ventricle  The left ventricle is normal in structure, function and size. There is normal  left ventricular wall thickness. Left ventricular systolic function is normal.  The visual ejection fraction is estimated at 60-65%. Left ventricular  diastolic function is indeterminate. No regional wall motion abnormalities  noted.     Right Ventricle  The right ventricle is normal in structure, function and size.     Atria  The left atrium is severely dilated. The right atrium is moderately dilated.  There is no color Doppler evidence of an atrial shunt.     Mitral Valve  The mitral valve is normal in structure and function. There is trace mitral  regurgitation.        Tricuspid Valve  The tricuspid valve is normal in structure and function. There is mild (1+)  tricuspid regurgitation. The right ventricular systolic pressure is  approximated at 33.4 mmHg  plus the right atrial pressure. Right ventricular  systolic pressure is elevated, consistent with mild pulmonary hypertension.     Aortic Valve  There is mild trileaflet aortic sclerosis. No aortic regurgitation is present.     Pulmonic Valve  The pulmonic valve is not well seen, but is grossly normal.     Vessels  Normal size aorta. The inferior vena cava was normal in size with preserved  respiratory variability.     Pericardium  There is no pericardial effusion.        Rhythm  Sinus rhythm was noted.  _____________________________________________________________________________  __  MMode/2D Measurements & Calculations     IVSd: 0.79 cm  LVIDd: 3.9 cm  LVIDs: 2.5 cm  LVPWd: 0.60 cm  FS: 34.5 %  LV mass(C)d: 74.0 grams  LV mass(C)dI: 43.0 grams/m2  Ao root diam: 3.3 cm  asc Aorta Diam: 3.4 cm  LVOT diam: 1.9 cm  LVOT area: 2.8 cm2  LA Volume (BP): 97.4 ml  LA Volume Index (BP): 56.6 ml/m2  RWT: 0.31           Doppler Measurements & Calculations  MV E max rey: 88.3 cm/sec  MV A max rey: 100.4 cm/sec  MV E/A: 0.88  MV dec time: 0.21 sec  Ao V2 max: 212.0 cm/sec  Ao max P.0 mmHg  Ao V2 mean: 147.8 cm/sec  Ao mean PG: 10.0 mmHg  Ao V2 VTI: 56.7 cm  RAMIRO(I,D): 1.4 cm2  RAMIRO(V,D): 1.6 cm2  LV V1 max P.6 mmHg  LV V1 max: 118.4 cm/sec  LV V1 VTI: 29.2 cm  SV(LVOT): 81.7 ml  SI(LVOT): 47.5 ml/m2  TR max rey: 288.4 cm/sec  TR max P.4 mmHg  AV Rey Ratio (DI): 0.56  RAMIRO Index (cm2/m2): 0.84  E/E' av.3  Lateral E/e': 12.5  Medial E/e': 12.1              _____________________________________________________________________________  __        Report approved by: Laurence Blackburn 10/23/2020 10:12 AM      MRI Brain w & w/o contrast    Narrative    MRI BRAIN WITHOUT AND WITH CONTRAST  10/23/2020 12:23 PM     HISTORY:  Transient ischemic attack; episode of slurred speech lasting  3-4 minutes before resolving on own.     TECHNIQUE:  Multiplanar, multisequence MRI of the brain without and  with 7.5 mL Gadavist.      COMPARISON: Head CT from earlier the same day.     FINDINGS: There is no evidence of acute infarct, hemorrhage, mass, or  herniation. Mild diffuse parenchymal volume loss. Mild patchy deep and  subcortical white matter T2 hyperintensities which are nonspecific,  but likely related to chronic microvascular ischemic disease.  Ventricular size within normal limits without hydrocephalus.     There is no abnormal intracranial enhancement.     Polypoid mucous retention cyst or mucosal polyp in the inferior right  maxillary sinus. Small, left greater than right, mastoid effusions.    Clyde-shaped globes bilaterally.      Impression    IMPRESSION:    1. No evidence of acute infarct, mass, hemorrhage, or herniation.  2. Mild diffuse parenchymal volume loss and white matter changes  likely due to chronic microvascular ischemic disease.     ANGELINA BROWN MD              Pending Results:        Unresulted Labs Ordered in the Past 30 Days of this Admission     No orders found for last 31 day(s).              Consultations This Hospital Stay:      Consultation during this admission received from neurology         Discharge Instructions and Follow-Up:      Follow-up Appointments     Follow-up and recommended labs and tests       Follow up with primary care provider, PUJA NICHOLS, within 7 days for   hospital follow- up.  No follow up labs or test are needed.  Hold off on starting Amlodipine until follow up with PCP given documented   hypotension with EMS.   30 day cardiac event monitor.  Start daily ASA           Pt instructed to follow up with PCP or Neurologist within 4-6 weekds of discharge.    Follow-up Labs None        Discharge Disposition:      Discharged to home         Discharge Medications:        Current Discharge Medication List      START taking these medications    Details   aspirin (ASA) 81 MG chewable tablet Take 1 tablet (81 mg) by mouth daily  Qty: 30 tablet, Refills: 0    Associated Diagnoses: TIA  (transient ischemic attack)         CONTINUE these medications which have CHANGED    Details   amLODIPine (NORVASC) 5 MG tablet Take 1 tablet (5 mg) by mouth daily  Qty:      Comments: Hold off on starting this medication until follow up with PCP         CONTINUE these medications which have NOT CHANGED    Details   betamethasone dipropionate (DIPROSONE) 0.05 % external ointment Apply 1 Application topically 2 times daily as needed for rash      brimonidine (ALPHAGAN) 0.2 % ophthalmic solution Place 1 drop into both eyes 2 times daily      busPIRone (BUSPAR) 5 MG tablet Take 5 mg by mouth 2 times daily      citalopram (CELEXA) 20 MG tablet Take 20 mg by mouth every morning      glucosamine-chondroitin 500-400 MG CAPS per capsule Take 1 capsule by mouth daily      hydrochlorothiazide (HYDRODIURIL) 25 MG tablet Take 25 mg by mouth daily      latanoprost (XALATAN) 0.005 % ophthalmic solution Place 1 drop into both eyes At Bedtime      levothyroxine (SYNTHROID/LEVOTHROID) 75 MCG tablet Take 75 mcg by mouth daily before breakfast      losartan (COZAAR) 25 MG tablet Take 25 mg by mouth 2 times daily      magnesium 250 MG tablet Take 1 tablet by mouth daily OTC supplement      metoprolol succinate ER (TOPROL-XL) 25 MG 24 hr tablet Take 25 mg by mouth daily in the Afternoon      polyethylene glycol-propylene glycol (SYSTANE ULTRA) 0.4-0.3 % SOLN ophthalmic solution Apply 1-2 drops to eye 4 times daily as needed for dry eyes      sodium chloride (MCKINLEY 128) 5 % ophthalmic ointment Place 1 Application into both eyes nightly as needed for dry eyes      tolterodine (DETROL) 1 MG tablet Take 1 mg by mouth ON HOLD                 Allergies:       No Known Allergies        Condition and Physical on Discharge:      Discharge condition: Stable   Vitals: Blood pressure 136/66, pulse 61, temperature 96.5  F (35.8  C), temperature source Oral, resp. rate 16, SpO2 94 %.  0 lbs 0 oz      GENERAL:  Comfortable.  PSYCH: pleasant, oriented,  No acute distress.  HEART:  Normal S1, S2 with no murmur, no pericardial rub, gallops or S3 or S4.  LUNGS:  Clear to auscultation, normal Respiratory effort. No wheezing, rales or ronchi.  EXTREMITIES:  Able to ambulate independently   SKIN:  Dry to touch, No rash, wound or ulcerations.  NEUROLOGIC:  Grossly intact.     Flory Henao PA-C

## 2020-10-23 NOTE — CONSULTS
"[facility]    Stroke Consult Note    Reason for Consult:  \"TIA\"    Chief Complaint: No chief complaint on file.       HPI  Melissa Quinonez is a 79 year old female who has a past medical history of HTN, HLD, hypothyroidism, glaucoma, depression, and anxiety.    Yesterday, she was working in the school, arrived at school at 0730, gave first class, then had a break, sat down in table, then apparently she fell asleep. Another teacher came and woke her up, she answered questions without opening eyes, but answered the questions. School nurse came, called EMS. For EMS, blood pressure was 78 systolic. During EMS ride and when arriving to ED, she felt normal. But then in the ED, she began to have trouble speaking. She notes it was specifically a problem with fluency of speech. She realized something was wrong as did her family member who was there with her. It lasted less than 5 minutes, and she has not had any recurrence. After that episode last evening, and today, she feels totally normal.     The school told her family that on Wednesday she  also was completely asleep 90 minutes, unaware that this happened. This is very unlike her.    About 6 months ago she had a similar episode, the granddaughter said she had a speech problem.  Daughter thought she was just dehydrated.  Did not get formal evaluation for this episode.    BP was 100/60 on arrival, later 140-160 range systolic.    TIA Evaluation Summarized  MRI and/or Head CT: MRI brain normal  Intracranial Vascular Imaging: CTA head normal  Cervical Carotid and Vertebral Artery Vascular Imaging: CTA neck normal  Echocardiogram: EF 60-65%, LA severely dilated, RA moderately dilated  EKG/Telemetry: sinus bradycardia  LDL: 100  A1c: 5.6  Other testing: Not Applicable    ABCD2 Patients Score   Age ? 60 years 1 point 1   Blood Pressure     -SBP ? 140 or DBP ?  90    1 point 0   Clinical Features    -Unilateral weakness   -Speech disturbance w/o " "weakness    -Other    2 points  1 point    0 points 1   Duration of symptoms    ?60 minutes    10-59 minutes    <10 minutes   2 points  1 point  0 points 0   Diabetes  1 point 0   Patient s ABCD2 Score (0-7) = 2           Impression  Transient Ischemic Attack - brief episode of aphasia without any focal deficits, yet concerning for TIA.    Additional episodes of hypersomnolence without explanation at this time which would likely not be consistent with TIA    Recommendations  - Load with Plavix 300mg; continue Plavix 75 mg and aspirin 81mg daily together for 21 days; then aspirin 325mg alone based on her weight of >70kg  - Start lipitor 10mg, as LDL is already fairly low, goal around  since she has no obvious atherosclerosis  - Continue to monitor blood pressure, goal 120/80  - Agree with 30 day  Cardiac event monitor to screen for atrial fibrillation    Patient Follow-up    Hospital follow up for stroke clinic: schedule with stroke specialist LG: Andreia Yang PA-C first week of December  [facility] [phone]      Thank you for this consult. No further stroke evaluation is recommended, so we will sign off. Please contact us with any additional questions.    Maggie Yang PA-C  Neurology  10/23/2020 4:57 PM  To page me or covering stroke neurology team member, click here: AMCOM   Choose \"On Call\" tab at top, then search dropdown box for \"Neurology Adult\", select location, press Enter, then look for stroke/neuro ICU/telestroke.    _____________________________________________________    Past Medical History   No past medical history on file.  Past Surgical History   No past surgical history on file.  Medications   Home Meds  Prior to Admission medications    Medication Sig Start Date End Date Taking? Authorizing Provider   amLODIPine (NORVASC) 5 MG tablet Take 1 tablet (5 mg) by mouth daily 10/23/20  Yes Flory Henao PA-C   aspirin (ASA) 81 MG chewable tablet Take 1 tablet (81 " mg) by mouth daily 10/24/20  Yes Flory Henao, DERRELL   betamethasone dipropionate (DIPROSONE) 0.05 % external ointment Apply 1 Application topically 2 times daily as needed for rash 12/12/19  Yes Unknown, Entered By History   brimonidine (ALPHAGAN) 0.2 % ophthalmic solution Place 1 drop into both eyes 2 times daily 8/13/20  Yes Unknown, Entered By History   busPIRone (BUSPAR) 5 MG tablet Take 5 mg by mouth 2 times daily 8/5/20  Yes Unknown, Entered By History   citalopram (CELEXA) 20 MG tablet Take 20 mg by mouth every morning 8/25/20  Yes Unknown, Entered By History   glucosamine-chondroitin 500-400 MG CAPS per capsule Take 1 capsule by mouth daily   Yes Unknown, Entered By History   hydrochlorothiazide (HYDRODIURIL) 25 MG tablet Take 25 mg by mouth daily 9/1/20  Yes Unknown, Entered By History   latanoprost (XALATAN) 0.005 % ophthalmic solution Place 1 drop into both eyes At Bedtime 8/6/20  Yes Unknown, Entered By History   levothyroxine (SYNTHROID/LEVOTHROID) 75 MCG tablet Take 75 mcg by mouth daily before breakfast 6/9/20  Yes Unknown, Entered By History   losartan (COZAAR) 25 MG tablet Take 25 mg by mouth 2 times daily 12/8/19  Yes Unknown, Entered By History   magnesium 250 MG tablet Take 1 tablet by mouth daily OTC supplement   Yes Unknown, Entered By History   metoprolol succinate ER (TOPROL-XL) 25 MG 24 hr tablet Take 25 mg by mouth daily in the Afternoon 9/1/20  Yes Unknown, Entered By History   polyethylene glycol-propylene glycol (SYSTANE ULTRA) 0.4-0.3 % SOLN ophthalmic solution Apply 1-2 drops to eye 4 times daily as needed for dry eyes   Yes Unknown, Entered By History   sodium chloride (MCKINLEY 128) 5 % ophthalmic ointment Place 1 Application into both eyes nightly as needed for dry eyes 8/6/20  Yes Unknown, Entered By History   tolterodine (DETROL) 1 MG tablet Take 1 mg by mouth ON HOLD    Unknown, Entered By History       Scheduled Meds    [START ON 10/24/2020] aspirin  81 mg Oral Daily      atorvastatin  10 mg Oral QPM     [START ON 10/24/2020] clopidogrel  75 mg Oral Daily       Infusion Meds      PRN Meds  acetaminophen, acetaminophen, melatonin, naloxone, ondansetron **OR** ondansetron, polyethylene glycol, prochlorperazine **OR** prochlorperazine **OR** prochlorperazine, senna-docusate **OR** senna-docusate    Allergies   No Known Allergies  Family History   No family history on file.  Social History   Social History     Tobacco Use     Smoking status: Not on file   Substance Use Topics     Alcohol use: Not on file     Drug use: Not on file       Review of Systems   The 10 point Review of Systems is negative other than noted in the HPI or here.        PHYSICAL EXAMINATION   Temp:  [95.7  F (35.4  C)-96.9  F (36.1  C)] 96.5  F (35.8  C)  Pulse:  [53-64] 59  Resp:  [13-19] 18  BP: (136-169)/(61-84) 156/79  SpO2:  [94 %-99 %] 97 %    General:  patient lying in bed without any acute distress    HEENT:  normocephalic/atraumatic  Pulmonary:  no respiratory distress    Neurologic  Mental Status:  alert, oriented x 3, follows commands, speech clear and fluent, naming and repetition normal  Cranial Nerves:  visual fields intact (tested by nurse), EOMI with normal smooth pursuit, facial sensation intact and symmetric (tested by nurse), facial movements symmetric, hearing not formally tested but intact to conversation, no dysarthria, shoulder shrug equal bilaterally, tongue protrusion midline  Motor:  no abnormal movements, able to move all limbs antigravity spontaneously with no signs of hemiparesis observed, no pronator drift  Reflexes:  unable to test (telestroke)  Sensory:  light touch sensation intact and symmetric throughout upper and lower extremities (assessed by nurse), no extinction on double simultaneous stimulation (assessed by nurse)  Coordination:  normal finger-to-nose and heel-to-shin bilaterally without dysmetria, rapid alternating movements symmetric  Station/Gait:  unable to test due to  telestroke      Dysphagia Screen  Per Nursing    Stroke Scales    NIHSS  Interval     Interval Comments     1a. Level of Consciousness     1b. LOC Questions     1c. LOC Commands     2.   Best Gaze     3.   Visual     4.   Facial Palsy     5a. Motor Arm, Left     5b. Motor Arm, Right     6a. Motor Leg, Left     6b. Motor Leg, right     7.   Limb Ataxia     8.   Sensory     9.   Best Language     10. Dysarthria     11. Extinction and Inattention      Total   zero       Imaging  I personally reviewed all imaging; relevant findings per HPI.    Labs Data   CBC  Recent Labs   Lab 10/23/20  0616 10/22/20  1248   WBC 3.4* 4.8   RBC 3.43* 3.70*   HGB 10.6* 11.3*   HCT 32.5* 34.7*    184     Basic Metabolic Panel   Recent Labs   Lab 10/23/20  0616 10/22/20  1248    138   POTASSIUM 3.5 3.8   CHLORIDE 110* 106   CO2 26 28   BUN 19 31*   CR 0.81 1.21*   GLC 89 91   BILL 7.9* 7.9*     Liver Panel  Recent Labs   Lab 10/22/20  1248   PROTTOTAL 6.5*   ALBUMIN 3.1*   BILITOTAL 0.5   ALKPHOS 75   AST 15   ALT 16     INR  No lab results found.   Lipid Profile    Recent Labs   Lab Test 10/22/20  2232   CHOL 186   HDL 65   *   TRIG 107     A1C    Recent Labs   Lab Test 10/22/20  2232   A1C 5.6     Troponin I    Recent Labs   Lab 10/22/20  1248   TROPI <0.015          Stroke Code / Stroke Consult Data Data Telestroke Service Details  (for non-emergent stroke consult with tele)  Video start time 10/23/20   1600   Video end time 10/23/20   1637   Type of service telemedicine diagnostic assessment of acute neurological changes   Reason telemedicine is appropriate patient requires assessment with a specialist for diagnosis and treatment of neurological symptoms   Mode of transmission secure interactive audio and video communication per Janae   Originating site (patient location) [facility]    Distant site (provider location) Provider remote site       I have personally spent a total of 50 minutes  providing care and consulting with this patient's medical providers today, with more than 50% of this time spent in consultation, coordination of care, and discussion with the patient and/or family regarding diagnostic results, prognosis, symptom management, risks and benefits of management options, and development of plan of care.

## 2020-10-23 NOTE — PLAN OF CARE
ROOM # 212 2    Living Situation  Lives independently in the community:  Facility name:  : [facility]    [phone]    Activity level at baseline: indep  Activity level on admit: standby assist      Patient registered to observation; given Patient Bill of Rights; given the opportunity to ask questions about observation status and their plan of care.  Patient has been oriented to the observation room, bathroom and call light is in place.    Discussed discharge goals and expectations with patient/family.

## 2020-10-23 NOTE — PLAN OF CARE
PRIMARY DIAGNOSIS: SYNCOPE/TIA  OUTPATIENT/OBSERVATION GOALS TO BE MET BEFORE DISCHARGE:  1. Orthostatic performed: N/A    2. Diagnostic testing complete & at baseline neurologic testing: Yes    3. Cleared by consultants (if involved): No    4. Interpretation of cardiac rhythm per telemetry tech: NSR    5. Tolerating adequate PO diet and medications: Yes    6. Return to near baseline physical activity or neurologic status: Yes    Discharge Planner Nurse   Safe discharge environment identified: Yes  Barriers to discharge: Yes       Entered by: Marla Brown 10/23/2020 1:51 PM     Please review provider order for any additional goals.   Nurse to notify provider when observation goals have been met and patient is ready for discharge.    Plan for tele-stroke at 1600

## 2020-10-23 NOTE — PROGRESS NOTES
30 Day cardiac event monitor was set up, written and verbal instructions were given. Daughter was present and interpreted.

## 2020-10-23 NOTE — PLAN OF CARE
PRIMARY DIAGNOSIS: SYNCOPE/TIA  OUTPATIENT/OBSERVATION GOALS TO BE MET BEFORE DISCHARGE:  1. Orthostatic performed: N/A    2. Diagnostic testing complete & at baseline neurologic testing: Yes    3. Cleared by consultants (if involved): No    4. Interpretation of cardiac rhythm per telemetry tech: NSR    5. Tolerating adequate PO diet and medications: Yes    6. Return to near baseline physical activity or neurologic status: Yes    Discharge Planner Nurse   Safe discharge environment identified: Yes  Barriers to discharge: Yes       Entered by: Marla Brown 10/23/2020 1:51 PM     Please review provider order for any additional goals.   Nurse to notify provider when observation goals have been met and patient is ready for discharge.

## 2020-10-23 NOTE — PLAN OF CARE
PRIMARY DIAGNOSIS: TIA/slurred speech  OUTPATIENT/OBSERVATION GOALS TO BE MET BEFORE DISCHARGE:  1. Orthostatic performed: N/A    2. Diagnostic testing complete & at baseline neurologic testing: Yes- Still needs MRI, back to baseline neurologic status    3. Cleared by consultants (if involved): N/A    4. Interpretation of cardiac rhythm per telemetry tech: SR    5. Tolerating adequate PO diet and medications: Yes    6. Return to near baseline physical activity or neurologic status: Yes    Discharge Planner Nurse   Safe discharge environment identified: Yes  Barriers to discharge: Yes- still needs MRI done       Entered by: Andreia Adams 10/23/2020 2:24 AM     Please review provider order for any additional goals.   Nurse to notify provider when observation goals have been met and patient is ready for discharge.

## 2020-10-26 LAB — INTERPRETATION ECG - MUSE: NORMAL

## 2020-12-02 ENCOUNTER — VIRTUAL VISIT (OUTPATIENT)
Dept: NEUROSURGERY | Facility: CLINIC | Age: 80
End: 2020-12-02
Attending: PHYSICIAN ASSISTANT
Payer: COMMERCIAL

## 2020-12-02 VITALS — WEIGHT: 165 LBS | HEIGHT: 61 IN | BODY MASS INDEX: 31.15 KG/M2

## 2020-12-02 DIAGNOSIS — G45.9 TIA (TRANSIENT ISCHEMIC ATTACK): ICD-10-CM

## 2020-12-02 PROCEDURE — 99212 OFFICE O/P EST SF 10 MIN: CPT | Mod: 95 | Performed by: PHYSICIAN ASSISTANT

## 2020-12-02 ASSESSMENT — MIFFLIN-ST. JEOR: SCORE: 1145.82

## 2020-12-02 NOTE — PROGRESS NOTES
"Melissa Quinonez is a 80 year old female who is being evaluated via a billable video visit.      The patient has been notified of following:     \"This video visit will be conducted via a call between you and your physician/provider. We have found that certain health care needs can be provided without the need for an in-person physical exam.  This service lets us provide the care you need with a video conversation.  If a prescription is necessary we can send it directly to your pharmacy.  If lab work is needed we can place an order for that and you can then stop by our lab to have the test done at a later time.    Video visits are billed at different rates depending on your insurance coverage.  Please reach out to your insurance provider with any questions.    If during the course of the call the physician/provider feels a video visit is not appropriate, you will not be charged for this service.\"    Patient has given verbal consent for Video visit? Yes  How would you like to obtain your AVS? MyChart  If you are dropped from the video visit, the video invite should be resent to: Text to cell phone: Javier [phone]   Will anyone else be joining your video visit? No   Oskar Sandy MA on 12/2/2020 at 2:59 PM        Video-Visit Details    Type of service:  Video Visit    Video Start Time: 1503  Video End Time: 1515    Originating Location (pt. Location): Other (car)    Distant Location (provider location):  [facility] [place]     Platform used for Video Visit: Javier Yang PA-C          __________________________________________________________      [facility]    [facility] [phone]  __________________________________________________________    Chief Complaint: Patient presents with:  Neurologic Problem: Stroke follow up   Video Visit: Javier [phone] per Daughter      History of Present Illness: Melissa" Fide Quinonez is a 80 year old female presenting for follow-up for TIA.     She was hospitalized at [facility] during late October. Prior to the hospital stay, she had a past medical history of HTN, HLD, hypothyroidism, glaucoma, depression, and anxiety..    She presented to the hospital with an episode that happened while she was working. She  arrived at school at 0730, gave first class, then had a break, sat down in table, then apparently she fell asleep. Another teacher came and woke her up, she answered questions without opening eyes, but answered the questions. School nurse came, called EMS. For EMS, blood pressure was 78 systolic. During EMS ride and when arriving to ED, she felt normal. But then in the ED, she began to have trouble speaking. She notes it was specifically a problem with fluency of speech. She realized something was wrong as did her family member who was there with her. It lasted less than 5 minutes, and she has not had any recurrence. After that episode last evening, and today, she feels totally normal.     TIA Evaluation Summarized  MRI and/or Head CT: MRI brain normal  Intracranial Vascular Imaging: CTA head normal  Cervical Carotid and Vertebral Artery Vascular Imaging: CTA neck normal  Echocardiogram: EF 60-65%, LA severely dilated, RA moderately dilated  EKG/Telemetry: sinus bradycardia  LDL: 100  A1c: 5.6  Other testing: Not Applicable    ABCD2 Patients Score   Age ? 60 years 1 point 1   Blood Pressure     -SBP ? 140 or DBP ?  90     1 point 0   Clinical Features    -Unilateral weakness   -Speech disturbance w/o weakness    -Other    2 points  1 point     0 points 1   Duration of symptoms    ?60 minutes    10-59 minutes    <10 minutes    2 points  1 point  0 points 0   Diabetes  1 point 0   Patient s ABCD2 Score (0-7) = 2         She was started on dual antiplatelets (aspirin and Plavix) for 21 days for recurrent stroke prevention as well as lipitor 10mg. Since being discharged,  "she has been doing well with no recurrent symptoms whatsoever.  She has had no shortness of breath, palpitations, loss of consciousness, or focal neurologic symptoms.  She is taking aspirin daily faithfully.    She completed 30-day Cardionet monitor and it appears as though no atrial fibrillation was seen. Resulted as \"a 3 beat run atrial triplet PACs\"    Impression:   Problem List Items Addressed This Visit        Circulatory    TIA (transient ischemic attack)        Transient Ischemic Attack - brief episode of aphasia without any focal deficits, yet concerning for TIA. Workup has been unremarkable. Her severely dilated left atrium is concerning, but 30 day monitor showed no atrial fibrillation.  Additional episodes of hypersomnolence without explanation at this time which would likely not be consistent with TIA    Plan:   - Continue aspirin daily  - Continue to monitor blood pressure regularly, let PCP know if consistently over 140 systolic  - Follow up as needed    Stroke Education provided.  She will call us with any questions.  For any acute neurologic deficits she was advised to  go directly to the hospital rather than call the clinic.    Maggie Yang PA-C  Neurology  12/02/2020 3:06 PM  To page me or covering stroke neurology team member, click here: AMCOM  Choose \"On Call\" tab at top, then search dropdown box for \"Neurology Adult\" & press Enter, look for Neuro ICU/Stroke    ___________________________________________________________________    Current Medications  Current Outpatient Medications   Medication Sig     amLODIPine (NORVASC) 5 MG tablet Take 1 tablet (5 mg) by mouth daily     aspirin (ASA) 325 MG EC tablet Take 1 tablet (325 mg) by mouth daily     atorvastatin (LIPITOR) 10 MG tablet Take 1 tablet (10 mg) by mouth every evening     betamethasone dipropionate (DIPROSONE) 0.05 % external ointment Apply 1 Application topically 2 times daily as needed for rash     brimonidine (ALPHAGAN) 0.2 % " "ophthalmic solution Place 1 drop into both eyes 2 times daily     busPIRone (BUSPAR) 5 MG tablet Take 5 mg by mouth 2 times daily     citalopram (CELEXA) 20 MG tablet Take 20 mg by mouth every morning     clopidogrel (PLAVIX) 75 MG tablet Take 1 tablet (75 mg) by mouth daily     glucosamine-chondroitin 500-400 MG CAPS per capsule Take 1 capsule by mouth daily     hydrochlorothiazide (HYDRODIURIL) 25 MG tablet Take 25 mg by mouth daily     latanoprost (XALATAN) 0.005 % ophthalmic solution Place 1 drop into both eyes At Bedtime     levothyroxine (SYNTHROID/LEVOTHROID) 75 MCG tablet Take 75 mcg by mouth daily before breakfast     losartan (COZAAR) 25 MG tablet Take 25 mg by mouth 2 times daily     magnesium 250 MG tablet Take 1 tablet by mouth daily OTC supplement     metoprolol succinate ER (TOPROL-XL) 25 MG 24 hr tablet Take 25 mg by mouth daily in the Afternoon     polyethylene glycol-propylene glycol (SYSTANE ULTRA) 0.4-0.3 % SOLN ophthalmic solution Apply 1-2 drops to eye 4 times daily as needed for dry eyes     sodium chloride (MCKINLEY 128) 5 % ophthalmic ointment Place 1 Application into both eyes nightly as needed for dry eyes     tolterodine (DETROL) 1 MG tablet Take 1 mg by mouth ON HOLD     aspirin (ASA) 81 MG chewable tablet Take 1 tablet (81 mg) by mouth daily (Patient not taking: Reported on 12/2/2020)     No current facility-administered medications for this visit.        Past Medical History  History reviewed. No pertinent past medical history.    Social History  Social History     Tobacco Use     Smoking status: Never Smoker     Smokeless tobacco: Never Used   Substance Use Topics     Alcohol use: None     Drug use: None       Family History  History reviewed. No pertinent family history.    ROS: 10 point relevant ROS neg other than the symptoms noted above in the HPI.    Physical Exam    Ht 5' 1\" (1.549 m)   Wt 165 lb (74.8 kg)   BMI 31.18 kg/m      General:  no acute distress  HEENT:  " normocephalic/atraumatic  Pulmonary:  no respiratory distress    Neurologic  Mental Status:  alert, oriented x 3, follows commands, speech clear and fluent,     Detailed neurologic exam not possible as she was the passenger in a car during the visit.    Neuroimaging: as per HPI. I personally reviewed those images    Labs:    No lab results found.     Recent Labs   Lab Test 10/22/20  2232   CHOL 186   HDL 65   *   TRIG 107       Recent Labs   Lab Test 10/22/20  2232   A1C 5.6       Recent Labs   Lab Test 10/22/20  1248   TROPI <0.015

## 2020-12-02 NOTE — LETTER
"    12/2/2020         RE: Melissa Quinonez  [address] [place] [address]        Dear Colleague,    Thank you for referring your patient, Melissa Quinonez, to the [facility] NEUROSURGERY [facility]. Please see a copy of my visit note below.    Melissa Quinonez is a 80 year old female who is being evaluated via a billable video visit.      The patient has been notified of following:     \"This video visit will be conducted via a call between you and your physician/provider. We have found that certain health care needs can be provided without the need for an in-person physical exam.  This service lets us provide the care you need with a video conversation.  If a prescription is necessary we can send it directly to your pharmacy.  If lab work is needed we can place an order for that and you can then stop by our lab to have the test done at a later time.    Video visits are billed at different rates depending on your insurance coverage.  Please reach out to your insurance provider with any questions.    If during the course of the call the physician/provider feels a video visit is not appropriate, you will not be charged for this service.\"    Patient has given verbal consent for Video visit? Yes  How would you like to obtain your AVS? MyChart  If you are dropped from the video visit, the video invite should be resent to: Text to cell phone: Javier [phone]   Will anyone else be joining your video visit? Mia Sandy MA on 12/2/2020 at 2:59 PM        Video-Visit Details    Type of service:  Video Visit    Video Start Time: 1503  Video End Time: 1515    Originating Location (pt. Location): Other (car)    Distant Location (provider location):  [facility] NEUROSURGERY [facility]     Platform used for Video Visit: Javier Yang PA-C          __________________________________________________________      [facility]    [facility] [phone]  __________________________________________________________    Chief Complaint: Patient presents with:  Neurologic Problem: Stroke follow up   Video Visit: Javier [phone] per Daughter      History of Present Illness: Melissa Quinonez is a 80 year old female presenting for follow-up for TIA.     She was hospitalized at [facility] during late October. Prior to the hospital stay, she had a past medical history of HTN, HLD, hypothyroidism, glaucoma, depression, and anxiety..    She presented to the hospital with an episode that happened while she was working. She  arrived at school at 0730, gave first class, then had a break, sat down in table, then apparently she fell asleep. Another teacher came and woke her up, she answered questions without opening eyes, but answered the questions. School nurse came, called EMS. For EMS, blood pressure was 78 systolic. During EMS ride and when arriving to ED, she felt normal. But then in the ED, she began to have trouble speaking. She notes it was specifically a problem with fluency of speech. She realized something was wrong as did her family member who was there with her. It lasted less than 5 minutes, and she has not had any recurrence. After that episode last evening, and today, she feels totally normal.     TIA Evaluation Summarized  MRI and/or Head CT: MRI brain normal  Intracranial Vascular Imaging: CTA head normal  Cervical Carotid and Vertebral Artery Vascular Imaging: CTA neck normal  Echocardiogram: EF 60-65%, LA severely dilated, RA moderately dilated  EKG/Telemetry: sinus bradycardia  LDL: 100  A1c: 5.6  Other testing: Not Applicable         ABCD2 Patients Score   Age ? 60 years 1 point 1   Blood Pressure     -SBP ? 140 or DBP ?  90     1 point 0   Clinical Features    -Unilateral weakness   -Speech disturbance w/o weakness    -Other    2 points  1 point     0 points 1   Duration of symptoms    ?60 minutes    10-59 minutes     "<10 minutes    2 points  1 point  0 points 0   Diabetes  1 point 0   Patient s ABCD2 Score (0-7) = 2         She was started on dual antiplatelets (aspirin and Plavix) for 21 days for recurrent stroke prevention as well as lipitor 10mg. Since being discharged, she has been doing well with no recurrent symptoms whatsoever.  She has had no shortness of breath, palpitations, loss of consciousness, or focal neurologic symptoms.  She is taking aspirin daily faithfully.    She completed 30-day Cardionet monitor and it appears as though no atrial fibrillation was seen. Resulted as \"a 3 beat run atrial triplet PACs\"    Impression:   Problem List Items Addressed This Visit        Circulatory    TIA (transient ischemic attack)        Transient Ischemic Attack - brief episode of aphasia without any focal deficits, yet concerning for TIA. Workup has been unremarkable. Her severely dilated left atrium is concerning, but 30 day monitor showed no atrial fibrillation.  Additional episodes of hypersomnolence without explanation at this time which would likely not be consistent with TIA    Plan:   - Continue aspirin daily  - Continue to monitor blood pressure regularly, let PCP know if consistently over 140 systolic  - Follow up as needed    Stroke Education provided.  She will call us with any questions.  For any acute neurologic deficits she was advised to  go directly to the hospital rather than call the clinic.    Maggie Yang PA-C  Neurology  12/02/2020 3:06 PM  To page me or covering stroke neurology team member, click here: AMCOM  Choose \"On Call\" tab at top, then search dropdown box for \"Neurology Adult\" & press Enter, look for Neuro ICU/Stroke    ___________________________________________________________________    Current Medications  Current Outpatient Medications   Medication Sig     amLODIPine (NORVASC) 5 MG tablet Take 1 tablet (5 mg) by mouth daily     aspirin (ASA) 325 MG EC tablet Take 1 tablet (325 mg) by mouth " daily     atorvastatin (LIPITOR) 10 MG tablet Take 1 tablet (10 mg) by mouth every evening     betamethasone dipropionate (DIPROSONE) 0.05 % external ointment Apply 1 Application topically 2 times daily as needed for rash     brimonidine (ALPHAGAN) 0.2 % ophthalmic solution Place 1 drop into both eyes 2 times daily     busPIRone (BUSPAR) 5 MG tablet Take 5 mg by mouth 2 times daily     citalopram (CELEXA) 20 MG tablet Take 20 mg by mouth every morning     clopidogrel (PLAVIX) 75 MG tablet Take 1 tablet (75 mg) by mouth daily     glucosamine-chondroitin 500-400 MG CAPS per capsule Take 1 capsule by mouth daily     hydrochlorothiazide (HYDRODIURIL) 25 MG tablet Take 25 mg by mouth daily     latanoprost (XALATAN) 0.005 % ophthalmic solution Place 1 drop into both eyes At Bedtime     levothyroxine (SYNTHROID/LEVOTHROID) 75 MCG tablet Take 75 mcg by mouth daily before breakfast     losartan (COZAAR) 25 MG tablet Take 25 mg by mouth 2 times daily     magnesium 250 MG tablet Take 1 tablet by mouth daily OTC supplement     metoprolol succinate ER (TOPROL-XL) 25 MG 24 hr tablet Take 25 mg by mouth daily in the Afternoon     polyethylene glycol-propylene glycol (SYSTANE ULTRA) 0.4-0.3 % SOLN ophthalmic solution Apply 1-2 drops to eye 4 times daily as needed for dry eyes     sodium chloride (MCKINLEY 128) 5 % ophthalmic ointment Place 1 Application into both eyes nightly as needed for dry eyes     tolterodine (DETROL) 1 MG tablet Take 1 mg by mouth ON HOLD     aspirin (ASA) 81 MG chewable tablet Take 1 tablet (81 mg) by mouth daily (Patient not taking: Reported on 12/2/2020)     No current facility-administered medications for this visit.        Past Medical History  History reviewed. No pertinent past medical history.    Social History  Social History     Tobacco Use     Smoking status: Never Smoker     Smokeless tobacco: Never Used   Substance Use Topics     Alcohol use: None     Drug use: None       Family History  History  "reviewed. No pertinent family history.    ROS: 10 point relevant ROS neg other than the symptoms noted above in the HPI.    Physical Exam    Ht 5' 1\" (1.549 m)   Wt 165 lb (74.8 kg)   BMI 31.18 kg/m      General:  no acute distress  HEENT:  normocephalic/atraumatic  Pulmonary:  no respiratory distress    Neurologic  Mental Status:  alert, oriented x 3, follows commands, speech clear and fluent,     Detailed neurologic exam not possible as she was the passenger in a car during the visit.    Neuroimaging: as per HPI. I personally reviewed those images    Labs:    No lab results found.     Recent Labs   Lab Test 10/22/20  2232   CHOL 186   HDL 65   *   TRIG 107       Recent Labs   Lab Test 10/22/20  2232   A1C 5.6       Recent Labs   Lab Test 10/22/20  1248   TROPI <0.015           Again, thank you for allowing me to participate in the care of your patient.        Sincerely,        Maggie Yang PA-C    "

## 2020-12-08 ENCOUNTER — TELEPHONE (OUTPATIENT)
Dept: NEUROSURGERY | Facility: CLINIC | Age: 80
End: 2020-12-08

## 2020-12-08 PROBLEM — G45.9 TIA (TRANSIENT ISCHEMIC ATTACK): Status: ACTIVE | Noted: 2020-12-08

## 2020-12-08 NOTE — TELEPHONE ENCOUNTER
Tried twice unsuccessfully today to reach patient or her daughter Rhoda to tell them that cardiac monitor turned out normal. Wasn't sure if I could leave this medical info on daughter's voicemail. --RN team, is there a location in the patient's chart to see if they've consented to this?  Anyway could you please try today or tomorrow to reach one of them? As per last visit plan is still follow up PRN  Thanks  Andreia

## 2020-12-09 NOTE — TELEPHONE ENCOUNTER
Consent to communicate not seen in the chart.  Talked with the daughter about having a signed consent form added to the chart.  The patient is in class until 3:00.  Pt and daughter  will call back later today for the message below.

## 2024-06-12 ENCOUNTER — HOSPITAL ENCOUNTER (EMERGENCY)
Facility: CLINIC | Age: 84
Discharge: HOME | End: 2024-06-12
Attending: EMERGENCY MEDICINE | Admitting: EMERGENCY MEDICINE
Payer: COMMERCIAL

## 2024-06-12 ENCOUNTER — APPOINTMENT (OUTPATIENT)
Dept: CT IMAGING | Facility: CLINIC | Age: 84
End: 2024-06-12
Attending: EMERGENCY MEDICINE
Payer: COMMERCIAL

## 2024-06-12 VITALS
DIASTOLIC BLOOD PRESSURE: 86 MMHG | OXYGEN SATURATION: 99 % | TEMPERATURE: 98.7 F | RESPIRATION RATE: 12 BRPM | SYSTOLIC BLOOD PRESSURE: 139 MMHG | HEART RATE: 51 BPM

## 2024-06-12 DIAGNOSIS — R51.9 HEADACHE, UNSPECIFIED HEADACHE TYPE: ICD-10-CM

## 2024-06-12 DIAGNOSIS — R53.83 FATIGUE, UNSPECIFIED TYPE: ICD-10-CM

## 2024-06-12 DIAGNOSIS — R53.1 GENERALIZED WEAKNESS: ICD-10-CM

## 2024-06-12 DIAGNOSIS — D72.819 LEUKOPENIA, UNSPECIFIED TYPE: ICD-10-CM

## 2024-06-12 LAB
ALBUMIN SERPL BCG-MCNC: 3.5 G/DL (ref 3.5–5.2)
ALBUMIN UR-MCNC: NEGATIVE MG/DL
ALP SERPL-CCNC: 84 U/L (ref 40–150)
ALT SERPL W P-5'-P-CCNC: 17 U/L (ref 0–50)
ANION GAP SERPL CALCULATED.3IONS-SCNC: 14 MMOL/L (ref 7–15)
APPEARANCE UR: CLEAR
AST SERPL W P-5'-P-CCNC: 16 U/L (ref 0–45)
ATRIAL RATE - MUSE: NORMAL BPM
BACTERIA #/AREA URNS HPF: ABNORMAL /HPF
BASOPHILS # BLD AUTO: 0 10E3/UL (ref 0–0.2)
BASOPHILS NFR BLD AUTO: 1 %
BILIRUB SERPL-MCNC: 0.3 MG/DL
BILIRUB UR QL STRIP: NEGATIVE
BUN SERPL-MCNC: 17.1 MG/DL (ref 8–23)
CALCIUM SERPL-MCNC: 8.6 MG/DL (ref 8.8–10.2)
CHLORIDE SERPL-SCNC: 106 MMOL/L (ref 98–107)
COLOR UR AUTO: ABNORMAL
CREAT SERPL-MCNC: 0.76 MG/DL (ref 0.51–0.95)
DEPRECATED HCO3 PLAS-SCNC: 20 MMOL/L (ref 22–29)
DIASTOLIC BLOOD PRESSURE - MUSE: NORMAL MMHG
EGFRCR SERPLBLD CKD-EPI 2021: 76 ML/MIN/1.73M2
EOSINOPHIL # BLD AUTO: 0.2 10E3/UL (ref 0–0.7)
EOSINOPHIL NFR BLD AUTO: 6 %
ERYTHROCYTE [DISTWIDTH] IN BLOOD BY AUTOMATED COUNT: 14.7 % (ref 10–15)
GLUCOSE SERPL-MCNC: 167 MG/DL (ref 70–99)
GLUCOSE UR STRIP-MCNC: NEGATIVE MG/DL
HCT VFR BLD AUTO: 33.3 % (ref 35–47)
HGB BLD-MCNC: 10.9 G/DL (ref 11.7–15.7)
HGB UR QL STRIP: NEGATIVE
HOLD SPECIMEN: NORMAL
HYALINE CASTS: 1 /LPF
IMM GRANULOCYTES # BLD: 0 10E3/UL
IMM GRANULOCYTES NFR BLD: 0 %
INTERPRETATION ECG - MUSE: NORMAL
KETONES UR STRIP-MCNC: NEGATIVE MG/DL
LEUKOCYTE ESTERASE UR QL STRIP: NEGATIVE
LYMPHOCYTES # BLD AUTO: 1.2 10E3/UL (ref 0.8–5.3)
LYMPHOCYTES NFR BLD AUTO: 45 %
MCH RBC QN AUTO: 30.8 PG (ref 26.5–33)
MCHC RBC AUTO-ENTMCNC: 32.7 G/DL (ref 31.5–36.5)
MCV RBC AUTO: 94 FL (ref 78–100)
MONOCYTES # BLD AUTO: 0.2 10E3/UL (ref 0–1.3)
MONOCYTES NFR BLD AUTO: 8 %
MUCOUS THREADS #/AREA URNS LPF: PRESENT /LPF
NEUTROPHILS # BLD AUTO: 1.1 10E3/UL (ref 1.6–8.3)
NEUTROPHILS NFR BLD AUTO: 40 %
NITRATE UR QL: NEGATIVE
NRBC # BLD AUTO: 0 10E3/UL
NRBC BLD AUTO-RTO: 0 /100
P AXIS - MUSE: NORMAL DEGREES
PH UR STRIP: 6.5 [PH] (ref 5–7)
PLATELET # BLD AUTO: 155 10E3/UL (ref 150–450)
POTASSIUM SERPL-SCNC: 4 MMOL/L (ref 3.4–5.3)
PR INTERVAL - MUSE: NORMAL MS
PROT SERPL-MCNC: 5.9 G/DL (ref 6.4–8.3)
QRS DURATION - MUSE: 86 MS
QT - MUSE: 436 MS
QTC - MUSE: 431 MS
R AXIS - MUSE: 39 DEGREES
RBC # BLD AUTO: 3.54 10E6/UL (ref 3.8–5.2)
RBC URINE: <1 /HPF
SODIUM SERPL-SCNC: 140 MMOL/L (ref 135–145)
SP GR UR STRIP: 1.01 (ref 1–1.03)
SYSTOLIC BLOOD PRESSURE - MUSE: NORMAL MMHG
T AXIS - MUSE: 44 DEGREES
TROPONIN T SERPL HS-MCNC: 9 NG/L
TROPONIN T SERPL HS-MCNC: 9 NG/L
TSH SERPL DL<=0.005 MIU/L-ACNC: 2.31 UIU/ML (ref 0.3–4.2)
UROBILINOGEN UR STRIP-MCNC: NORMAL MG/DL
VENTRICULAR RATE- MUSE: 59 BPM
WBC # BLD AUTO: 2.7 10E3/UL (ref 4–11)
WBC URINE: 1 /HPF

## 2024-06-12 PROCEDURE — 85004 AUTOMATED DIFF WBC COUNT: CPT | Performed by: EMERGENCY MEDICINE

## 2024-06-12 PROCEDURE — 70496 CT ANGIOGRAPHY HEAD: CPT

## 2024-06-12 PROCEDURE — 250N000011 HC RX IP 250 OP 636: Performed by: EMERGENCY MEDICINE

## 2024-06-12 PROCEDURE — 84443 ASSAY THYROID STIM HORMONE: CPT | Performed by: EMERGENCY MEDICINE

## 2024-06-12 PROCEDURE — 70450 CT HEAD/BRAIN W/O DYE: CPT

## 2024-06-12 PROCEDURE — 250N000009 HC RX 250: Performed by: EMERGENCY MEDICINE

## 2024-06-12 PROCEDURE — 99285 EMERGENCY DEPT VISIT HI MDM: CPT | Mod: 25

## 2024-06-12 PROCEDURE — 36415 COLL VENOUS BLD VENIPUNCTURE: CPT | Performed by: EMERGENCY MEDICINE

## 2024-06-12 PROCEDURE — 84484 ASSAY OF TROPONIN QUANT: CPT | Performed by: EMERGENCY MEDICINE

## 2024-06-12 PROCEDURE — 81001 URINALYSIS AUTO W/SCOPE: CPT | Performed by: EMERGENCY MEDICINE

## 2024-06-12 PROCEDURE — 93005 ELECTROCARDIOGRAM TRACING: CPT

## 2024-06-12 PROCEDURE — 80053 COMPREHEN METABOLIC PANEL: CPT | Performed by: EMERGENCY MEDICINE

## 2024-06-12 RX ORDER — IOPAMIDOL 755 MG/ML
67 INJECTION, SOLUTION INTRAVASCULAR ONCE
Status: COMPLETED | OUTPATIENT
Start: 2024-06-12 | End: 2024-06-12

## 2024-06-12 RX ADMIN — IOPAMIDOL 67 ML: 755 INJECTION, SOLUTION INTRAVENOUS at 17:48

## 2024-06-12 RX ADMIN — SODIUM CHLORIDE 100 ML: 9 INJECTION, SOLUTION INTRAVENOUS at 17:48

## 2024-06-12 ASSESSMENT — ACTIVITIES OF DAILY LIVING (ADL)
ADLS_ACUITY_SCORE: 36

## 2024-06-12 ASSESSMENT — COLUMBIA-SUICIDE SEVERITY RATING SCALE - C-SSRS
1. IN THE PAST MONTH, HAVE YOU WISHED YOU WERE DEAD OR WISHED YOU COULD GO TO SLEEP AND NOT WAKE UP?: NO
6. HAVE YOU EVER DONE ANYTHING, STARTED TO DO ANYTHING, OR PREPARED TO DO ANYTHING TO END YOUR LIFE?: NO
2. HAVE YOU ACTUALLY HAD ANY THOUGHTS OF KILLING YOURSELF IN THE PAST MONTH?: NO

## 2024-06-12 NOTE — ED PROVIDER NOTES
Emergency Department Note      History of Present Illness     Chief Complaint  Double Vision    HPI  Melissa Quinonez is a 83 year old female who has a history of hypertension comes to the emergency department for the evaluation for double vision. The daughter states that for the past three days, patient has been hearing intermittent loud banging noises that she describes as hearing metal scrape against metal inside her head. She woke up at 1300 and heard the loud banging today. She endorses blurry vision started yesterday and briefly improved yesterday but worsened today. Patient also reports weakness since yesterday, and notes that she feels she is moving and talking slower than her baseline. She states she can hear when people talk to her, but cannot always understand them and asks them to repeat. She denies localized pain, nausea, diarrhea, or vomiting. Leakage with urination and darker than normal, but no burning or frequency. No recent falls.     Independent Historian  Daughter as detailed above.    Review of External Notes  Progress note from 5/23/2024  Past Medical History   Medical History and Problem List  Heart murmur  Depression  Slow transit constipation  Internal hemorrhoids  Neutropenia  Chronic anemia  CKD  Hypertension  Arthritis of hand    Medications  Magnesium citrate  Ferrous sulfate  Diphenhydramine  Atorvastatin  Losartan  Amlodipine  Citalopram  Buspirone     Surgical History   Knee replacement  Bunionectomy  Colonoscopy  Physical Exam   Patient Vitals for the past 24 hrs:   BP Temp Temp src Pulse Resp SpO2   06/12/24 1800 139/86 -- -- 51 12 99 %   06/12/24 1500 -- 98.7  F (37.1  C) Temporal -- -- --   06/12/24 1439 116/66 -- -- 62 16 95 %     Physical Exam  General: Patient is alert and normal appearing.  HEENT: Head atraumatic    Eyes: pupils equal and reactive. Conjunctiva clear   Nares: patent   Oropharynx: no lesions, uvula midline, no palatal draping, normal voice, no  trismus  Neck: Supple without lymphadenopathy, no meningismus  Chest: Heart regular rate and rhythm.   Lungs: Equal clear to auscultation with no wheeze or rales  Abdomen: Soft, non tender, nondistended, normal bowel sounds  Back: No costovertebral angle tenderness, no midline C, T or L spine tenderness  Neuro: Grossly nonfocal, normal speech, strength equal bilaterally, CN 2-12 intact, no pronator drift, normal finger-to-nose  Extremities: No deformities, equal radial and DP pulses. No clubbing, cyanosis.  No edema  Skin: Warm and dry with no rash.     Diagnostics   Lab Results   Labs Ordered and Resulted from Time of ED Arrival to Time of ED Departure   COMPREHENSIVE METABOLIC PANEL - Abnormal       Result Value    Sodium 140      Potassium 4.0      Carbon Dioxide (CO2) 20 (*)     Anion Gap 14      Urea Nitrogen 17.1      Creatinine 0.76      GFR Estimate 76      Calcium 8.6 (*)     Chloride 106      Glucose 167 (*)     Alkaline Phosphatase 84      AST 16      ALT 17      Protein Total 5.9 (*)     Albumin 3.5      Bilirubin Total 0.3     ROUTINE UA WITH MICROSCOPIC REFLEX TO CULTURE - Abnormal    Color Urine Light Yellow      Appearance Urine Clear      Glucose Urine Negative      Bilirubin Urine Negative      Ketones Urine Negative      Specific Gravity Urine 1.014      Blood Urine Negative      pH Urine 6.5      Protein Albumin Urine Negative      Urobilinogen Urine Normal      Nitrite Urine Negative      Leukocyte Esterase Urine Negative      Bacteria Urine Few (*)     Mucus Urine Present (*)     RBC Urine <1      WBC Urine 1      Hyaline Casts Urine 1     CBC WITH PLATELETS AND DIFFERENTIAL - Abnormal    WBC Count 2.7 (*)     RBC Count 3.54 (*)     Hemoglobin 10.9 (*)     Hematocrit 33.3 (*)     MCV 94      MCH 30.8      MCHC 32.7      RDW 14.7      Platelet Count 155      % Neutrophils 40      % Lymphocytes 45      % Monocytes 8      % Eosinophils 6      % Basophils 1      % Immature Granulocytes 0      NRBCs  per 100 WBC 0      Absolute Neutrophils 1.1 (*)     Absolute Lymphocytes 1.2      Absolute Monocytes 0.2      Absolute Eosinophils 0.2      Absolute Basophils 0.0      Absolute Immature Granulocytes 0.0      Absolute NRBCs 0.0     TROPONIN T, HIGH SENSITIVITY - Normal    Troponin T, High Sensitivity 9     TSH WITH FREE T4 REFLEX - Normal    TSH 2.31     TROPONIN T, HIGH SENSITIVITY - Normal    Troponin T, High Sensitivity 9         Imaging  CTA Head Neck w Contrast   Preliminary Result   IMPRESSION:    HEAD CT:   1.  No CT evidence for acute intracranial process.   2.  Brain atrophy and presumed chronic microvascular ischemic changes as above.      HEAD CTA:    1.  No large artery stenosis or occlusion. No aneurysm.      NECK CTA:   1.  No measurable carotid or vertebral artery stenosis. No dissection.      CT Head w/o Contrast   Preliminary Result   IMPRESSION:    HEAD CT:   1.  No CT evidence for acute intracranial process.   2.  Brain atrophy and presumed chronic microvascular ischemic changes as above.      HEAD CTA:    1.  No large artery stenosis or occlusion. No aneurysm.      NECK CTA:   1.  No measurable carotid or vertebral artery stenosis. No dissection.          EKG   ECG taken at 1454, ECG read at 1500  Sinus bradycardia  Nonspecific T wave abnormality   Rate 59 bpm. PA interval * ms. QRS duration 86 ms. QT/QTc 436/431 ms. P-R-T axes * 39 44.    Independent Interpretation  CT Head: No intracranial hemorrhage or midline shift.  ED Course    Medications Administered  Medications   Saline Flush - CT (100 mLs Intravenous $Given 6/12/24 1748)   iopamidol (ISOVUE-370) solution 67 mL (67 mLs Intravenous $Given 6/12/24 1748)       Discussion of Management  None    Social Determinants of Health adding to complexity of care  None    ED Course  ED Course as of 06/12/24 1836 Wed Jun 12, 2024   1445 I checked and evaluated the patient.   1812 I rechecked the patient and explained findings. Patient discharged home  with instructions regarding supportive care, medications, and reasons to return. The importance of close follow-up was reviewed.      Medical Decision Making / Diagnosis   CMS Diagnoses: None    MIPS     None    MDM  Melissa Quinonez is a 83 year old female presents emergency department with multiple vague complaints.  Patient's daughter states that she slept until about 1 PM and was not wanting to get up.  In general she has had decreased motivation for physical activity despite her doctor recommending increased walking.  She has had intermittent headaches with the banging sound that she hears in her head over the last 2 to 3 days.  She has had some generalized weakness.  She has intermittent blurry vision but that is improved today.  Denies any chest pain, palpitations, shortness of breath.  Workup in the emergency department undertaken as noted above.  CT head and CTA without intracranial hemorrhage, mass effect, large vessel occlusion and she has a negative neurologic exam and no signs of stroke on exam.  Her history was not consistent with stroke either and we discussed possibility of MRI but they do not feel it is necessary for her to wait around for that and I agree with this.  EKG without new acute ischemic changes and troponin and delta troponin are negative.  Urinalysis is negative for signs of UTI.  She has a mild leukopenia but other cell lines are appropriate and no fever or infectious etiology is seen.  Commend follow-up with primary care for this.  Patient and her family are agreeable with this plan.  Return precautions emergency department reviewed.  Certainly for any strokelike symptoms she should return for reevaluation.  Recommend follow-up with neurology for her headaches and this odd sensation of banging that she hears in her head.  No sign of vascular abnormality on her head or neck to represent this.  All questions and concerns addressed.    Disposition  The patient was discharged.      ICD-10 Codes:    ICD-10-CM    1. Headache, unspecified headache type  R51.9       2. Generalized weakness  R53.1       3. Fatigue, unspecified type  R53.83       4. Leukopenia, unspecified type  D72.819            Discharge Medications  New Prescriptions    No medications on file         Scribe Disclosure:  I, Lyndsey Wise, am serving as a scribe with  Eve Fuchs at 6:06 PM on 6/12/2024 to document services personally performed by Melissa Ramos MD based on my observations and the provider's statements to me.        Melissa Ramos MD  06/12/24 2195

## 2025-07-12 ENCOUNTER — HEALTH MAINTENANCE LETTER (OUTPATIENT)
Age: 85
End: 2025-07-12